# Patient Record
Sex: MALE | Race: WHITE | NOT HISPANIC OR LATINO
[De-identification: names, ages, dates, MRNs, and addresses within clinical notes are randomized per-mention and may not be internally consistent; named-entity substitution may affect disease eponyms.]

---

## 2017-01-19 ENCOUNTER — MEDICATION RENEWAL (OUTPATIENT)
Age: 62
End: 2017-01-19

## 2017-03-07 ENCOUNTER — MEDICATION RENEWAL (OUTPATIENT)
Age: 62
End: 2017-03-07

## 2017-04-17 ENCOUNTER — MEDICATION RENEWAL (OUTPATIENT)
Age: 62
End: 2017-04-17

## 2017-06-19 ENCOUNTER — MEDICATION RENEWAL (OUTPATIENT)
Age: 62
End: 2017-06-19

## 2017-09-07 ENCOUNTER — RX RENEWAL (OUTPATIENT)
Age: 62
End: 2017-09-07

## 2017-11-30 ENCOUNTER — LABORATORY RESULT (OUTPATIENT)
Age: 62
End: 2017-11-30

## 2017-11-30 ENCOUNTER — MEDICATION RENEWAL (OUTPATIENT)
Age: 62
End: 2017-11-30

## 2017-11-30 ENCOUNTER — APPOINTMENT (OUTPATIENT)
Dept: INTERNAL MEDICINE | Facility: CLINIC | Age: 62
End: 2017-11-30
Payer: COMMERCIAL

## 2017-11-30 LAB — SAVE SPECIMEN: NORMAL

## 2017-11-30 PROCEDURE — 36415 COLL VENOUS BLD VENIPUNCTURE: CPT

## 2017-12-05 ENCOUNTER — NON-APPOINTMENT (OUTPATIENT)
Age: 62
End: 2017-12-05

## 2017-12-05 ENCOUNTER — APPOINTMENT (OUTPATIENT)
Dept: INTERNAL MEDICINE | Facility: CLINIC | Age: 62
End: 2017-12-05
Payer: COMMERCIAL

## 2017-12-05 VITALS
RESPIRATION RATE: 18 BRPM | DIASTOLIC BLOOD PRESSURE: 76 MMHG | OXYGEN SATURATION: 97 % | WEIGHT: 198 LBS | HEIGHT: 70 IN | HEART RATE: 74 BPM | TEMPERATURE: 97.8 F | SYSTOLIC BLOOD PRESSURE: 118 MMHG | BODY MASS INDEX: 28.35 KG/M2

## 2017-12-05 DIAGNOSIS — N13.8 BENIGN PROSTATIC HYPERPLASIA WITH LOWER URINARY TRACT SYMPMS: ICD-10-CM

## 2017-12-05 DIAGNOSIS — M54.9 DORSALGIA, UNSPECIFIED: ICD-10-CM

## 2017-12-05 DIAGNOSIS — N40.1 BENIGN PROSTATIC HYPERPLASIA WITH LOWER URINARY TRACT SYMPMS: ICD-10-CM

## 2017-12-05 PROCEDURE — 93000 ELECTROCARDIOGRAM COMPLETE: CPT

## 2017-12-05 PROCEDURE — 99396 PREV VISIT EST AGE 40-64: CPT | Mod: 25

## 2017-12-05 PROCEDURE — 94010 BREATHING CAPACITY TEST: CPT

## 2017-12-07 DIAGNOSIS — G47.00 INSOMNIA, UNSPECIFIED: ICD-10-CM

## 2017-12-07 LAB
ALBUMIN SERPL ELPH-MCNC: 4.5 G/DL
ALP BLD-CCNC: 61 U/L
ALT SERPL-CCNC: 48 U/L
ANION GAP SERPL CALC-SCNC: 15 MMOL/L
APPEARANCE: CLEAR
AST SERPL-CCNC: 33 U/L
BASOPHILS # BLD AUTO: 0.01 K/UL
BASOPHILS NFR BLD AUTO: 0.2 %
BILIRUB SERPL-MCNC: 0.7 MG/DL
BILIRUBIN URINE: NEGATIVE
BLOOD URINE: NEGATIVE
BUN SERPL-MCNC: 18 MG/DL
CALCIUM SERPL-MCNC: 9.5 MG/DL
CHLORIDE SERPL-SCNC: 102 MMOL/L
CHOLEST SERPL-MCNC: 187 MG/DL
CHOLEST/HDLC SERPL: 4 RATIO
CO2 SERPL-SCNC: 25 MMOL/L
COLOR: YELLOW
CREAT SERPL-MCNC: 0.97 MG/DL
EOSINOPHIL # BLD AUTO: 0.05 K/UL
EOSINOPHIL NFR BLD AUTO: 1.2 %
ERYTHROCYTE [SEDIMENTATION RATE] IN BLOOD BY WESTERGREN METHOD: 5 MM/HR
GLUCOSE QUALITATIVE U: NEGATIVE MG/DL
GLUCOSE SERPL-MCNC: 102 MG/DL
HCT VFR BLD CALC: 46 %
HDLC SERPL-MCNC: 47 MG/DL
HGB BLD-MCNC: 15.2 G/DL
IMM GRANULOCYTES NFR BLD AUTO: 0.2 %
KETONES URINE: NEGATIVE
LDLC SERPL CALC-MCNC: 127 MG/DL
LDLC SERPL DIRECT ASSAY-MCNC: 132 MG/DL
LEUKOCYTE ESTERASE URINE: NEGATIVE
LYMPHOCYTES # BLD AUTO: 1.42 K/UL
LYMPHOCYTES NFR BLD AUTO: 35 %
MAN DIFF?: NORMAL
MCHC RBC-ENTMCNC: 31 PG
MCHC RBC-ENTMCNC: 33 GM/DL
MCV RBC AUTO: 93.9 FL
MONOCYTES # BLD AUTO: 0.46 K/UL
MONOCYTES NFR BLD AUTO: 11.3 %
NEUTROPHILS # BLD AUTO: 2.11 K/UL
NEUTROPHILS NFR BLD AUTO: 52.1 %
NITRITE URINE: NEGATIVE
PH URINE: 7
PLATELET # BLD AUTO: 173 K/UL
POTASSIUM SERPL-SCNC: 4.3 MMOL/L
PROT SERPL-MCNC: 7.3 G/DL
PROTEIN URINE: NEGATIVE MG/DL
PSA SERPL-MCNC: 1.39 NG/ML
RBC # BLD: 4.9 M/UL
RBC # FLD: 13.9 %
SODIUM SERPL-SCNC: 142 MMOL/L
SPECIFIC GRAVITY URINE: 1.01
T3 SERPL-MCNC: 123 NG/DL
T3RU NFR SERPL: 1.12 INDEX
T4 FREE SERPL-MCNC: 1.1 NG/DL
TRIGL SERPL-MCNC: 64 MG/DL
TSH SERPL-ACNC: 0.85 UIU/ML
UROBILINOGEN URINE: NEGATIVE MG/DL
WBC # FLD AUTO: 4.06 K/UL

## 2017-12-07 RX ORDER — ZOLPIDEM TARTRATE 5 MG/1
5 TABLET ORAL
Refills: 0 | Status: DISCONTINUED | COMMUNITY
End: 2017-12-07

## 2018-02-15 ENCOUNTER — MEDICATION RENEWAL (OUTPATIENT)
Age: 63
End: 2018-02-15

## 2018-03-27 ENCOUNTER — MEDICATION RENEWAL (OUTPATIENT)
Age: 63
End: 2018-03-27

## 2018-05-07 ENCOUNTER — RX RENEWAL (OUTPATIENT)
Age: 63
End: 2018-05-07

## 2018-06-15 ENCOUNTER — MEDICATION RENEWAL (OUTPATIENT)
Age: 63
End: 2018-06-15

## 2018-06-30 ENCOUNTER — INPATIENT (INPATIENT)
Facility: HOSPITAL | Age: 63
LOS: 0 days | Discharge: HOME | End: 2018-07-01
Attending: HOSPITALIST | Admitting: HOSPITALIST
Payer: COMMERCIAL

## 2018-06-30 VITALS
SYSTOLIC BLOOD PRESSURE: 118 MMHG | OXYGEN SATURATION: 100 % | DIASTOLIC BLOOD PRESSURE: 64 MMHG | TEMPERATURE: 98 F | HEART RATE: 66 BPM | RESPIRATION RATE: 17 BRPM

## 2018-06-30 DIAGNOSIS — S22.41XA MULTIPLE FRACTURES OF RIBS, RIGHT SIDE, INITIAL ENCOUNTER FOR CLOSED FRACTURE: ICD-10-CM

## 2018-06-30 DIAGNOSIS — R52 PAIN, UNSPECIFIED: ICD-10-CM

## 2018-06-30 DIAGNOSIS — Z29.9 ENCOUNTER FOR PROPHYLACTIC MEASURES, UNSPECIFIED: ICD-10-CM

## 2018-06-30 DIAGNOSIS — F41.9 ANXIETY DISORDER, UNSPECIFIED: ICD-10-CM

## 2018-06-30 LAB
ALBUMIN SERPL ELPH-MCNC: 4.2 G/DL — SIGNIFICANT CHANGE UP (ref 3.3–5)
ALP SERPL-CCNC: 67 U/L — SIGNIFICANT CHANGE UP (ref 40–120)
ALT FLD-CCNC: 75 U/L — HIGH (ref 4–41)
AST SERPL-CCNC: 63 U/L — HIGH (ref 4–40)
BASOPHILS # BLD AUTO: 0.02 K/UL — SIGNIFICANT CHANGE UP (ref 0–0.2)
BASOPHILS NFR BLD AUTO: 0.2 % — SIGNIFICANT CHANGE UP (ref 0–2)
BILIRUB SERPL-MCNC: 0.7 MG/DL — SIGNIFICANT CHANGE UP (ref 0.2–1.2)
BUN SERPL-MCNC: 17 MG/DL — SIGNIFICANT CHANGE UP (ref 7–23)
CALCIUM SERPL-MCNC: 9.1 MG/DL — SIGNIFICANT CHANGE UP (ref 8.4–10.5)
CHLORIDE SERPL-SCNC: 102 MMOL/L — SIGNIFICANT CHANGE UP (ref 98–107)
CO2 SERPL-SCNC: 25 MMOL/L — SIGNIFICANT CHANGE UP (ref 22–31)
CREAT SERPL-MCNC: 0.94 MG/DL — SIGNIFICANT CHANGE UP (ref 0.5–1.3)
EOSINOPHIL # BLD AUTO: 0 K/UL — SIGNIFICANT CHANGE UP (ref 0–0.5)
EOSINOPHIL NFR BLD AUTO: 0 % — SIGNIFICANT CHANGE UP (ref 0–6)
GLUCOSE SERPL-MCNC: 117 MG/DL — HIGH (ref 70–99)
HCT VFR BLD CALC: 42.4 % — SIGNIFICANT CHANGE UP (ref 39–50)
HGB BLD-MCNC: 14.8 G/DL — SIGNIFICANT CHANGE UP (ref 13–17)
IMM GRANULOCYTES # BLD AUTO: 0.05 # — SIGNIFICANT CHANGE UP
IMM GRANULOCYTES NFR BLD AUTO: 0.5 % — SIGNIFICANT CHANGE UP (ref 0–1.5)
LYMPHOCYTES # BLD AUTO: 0.68 K/UL — LOW (ref 1–3.3)
LYMPHOCYTES # BLD AUTO: 6.4 % — LOW (ref 13–44)
MCHC RBC-ENTMCNC: 30.5 PG — SIGNIFICANT CHANGE UP (ref 27–34)
MCHC RBC-ENTMCNC: 34.9 % — SIGNIFICANT CHANGE UP (ref 32–36)
MCV RBC AUTO: 87.4 FL — SIGNIFICANT CHANGE UP (ref 80–100)
MONOCYTES # BLD AUTO: 0.73 K/UL — SIGNIFICANT CHANGE UP (ref 0–0.9)
MONOCYTES NFR BLD AUTO: 6.8 % — SIGNIFICANT CHANGE UP (ref 2–14)
NEUTROPHILS # BLD AUTO: 9.19 K/UL — HIGH (ref 1.8–7.4)
NEUTROPHILS NFR BLD AUTO: 86.1 % — HIGH (ref 43–77)
NRBC # FLD: 0 — SIGNIFICANT CHANGE UP
PLATELET # BLD AUTO: 175 K/UL — SIGNIFICANT CHANGE UP (ref 150–400)
PMV BLD: 9.5 FL — SIGNIFICANT CHANGE UP (ref 7–13)
POTASSIUM SERPL-MCNC: 4.2 MMOL/L — SIGNIFICANT CHANGE UP (ref 3.5–5.3)
POTASSIUM SERPL-SCNC: 4.2 MMOL/L — SIGNIFICANT CHANGE UP (ref 3.5–5.3)
PROT SERPL-MCNC: 7 G/DL — SIGNIFICANT CHANGE UP (ref 6–8.3)
RBC # BLD: 4.85 M/UL — SIGNIFICANT CHANGE UP (ref 4.2–5.8)
RBC # FLD: 12.7 % — SIGNIFICANT CHANGE UP (ref 10.3–14.5)
SODIUM SERPL-SCNC: 139 MMOL/L — SIGNIFICANT CHANGE UP (ref 135–145)
TROPONIN T, HIGH SENSITIVITY: 13 NG/L — SIGNIFICANT CHANGE UP (ref ?–14)
TROPONIN T, HIGH SENSITIVITY: 15 NG/L — SIGNIFICANT CHANGE UP (ref ?–14)
WBC # BLD: 10.67 K/UL — HIGH (ref 3.8–10.5)
WBC # FLD AUTO: 10.67 K/UL — HIGH (ref 3.8–10.5)

## 2018-06-30 PROCEDURE — 99223 1ST HOSP IP/OBS HIGH 75: CPT | Mod: AI

## 2018-06-30 PROCEDURE — 72125 CT NECK SPINE W/O DYE: CPT | Mod: 26

## 2018-06-30 PROCEDURE — 71250 CT THORAX DX C-: CPT | Mod: 26

## 2018-06-30 PROCEDURE — 70450 CT HEAD/BRAIN W/O DYE: CPT | Mod: 26

## 2018-06-30 PROCEDURE — 73630 X-RAY EXAM OF FOOT: CPT | Mod: 26,LT

## 2018-06-30 PROCEDURE — 73502 X-RAY EXAM HIP UNI 2-3 VIEWS: CPT | Mod: 26,RT

## 2018-06-30 RX ORDER — KETOROLAC TROMETHAMINE 30 MG/ML
15 SYRINGE (ML) INJECTION ONCE
Qty: 0 | Refills: 0 | Status: DISCONTINUED | OUTPATIENT
Start: 2018-06-30 | End: 2018-06-30

## 2018-06-30 RX ORDER — MORPHINE SULFATE 50 MG/1
4 CAPSULE, EXTENDED RELEASE ORAL ONCE
Qty: 0 | Refills: 0 | Status: DISCONTINUED | OUTPATIENT
Start: 2018-06-30 | End: 2018-06-30

## 2018-06-30 RX ORDER — OXYCODONE AND ACETAMINOPHEN 5; 325 MG/1; MG/1
2 TABLET ORAL EVERY 4 HOURS
Qty: 0 | Refills: 0 | Status: DISCONTINUED | OUTPATIENT
Start: 2018-06-30 | End: 2018-07-01

## 2018-06-30 RX ORDER — ESCITALOPRAM OXALATE 10 MG/1
10 TABLET, FILM COATED ORAL DAILY
Qty: 0 | Refills: 0 | Status: DISCONTINUED | OUTPATIENT
Start: 2018-06-30 | End: 2018-07-01

## 2018-06-30 RX ORDER — ACETAMINOPHEN 500 MG
650 TABLET ORAL EVERY 6 HOURS
Qty: 0 | Refills: 0 | Status: DISCONTINUED | OUTPATIENT
Start: 2018-06-30 | End: 2018-07-01

## 2018-06-30 RX ORDER — DESLORATADINE AND PSEUDOEPHEDRINE SULFATE 2.5; 12 MG/1; MG/1
0 TABLET, EXTENDED RELEASE ORAL
Qty: 0 | Refills: 0 | COMMUNITY

## 2018-06-30 RX ORDER — IBUPROFEN 200 MG
600 TABLET ORAL EVERY 6 HOURS
Qty: 0 | Refills: 0 | Status: DISCONTINUED | OUTPATIENT
Start: 2018-06-30 | End: 2018-07-01

## 2018-06-30 RX ORDER — POLYETHYLENE GLYCOL 3350 17 G/17G
17 POWDER, FOR SOLUTION ORAL DAILY
Qty: 0 | Refills: 0 | Status: DISCONTINUED | OUTPATIENT
Start: 2018-06-30 | End: 2018-07-01

## 2018-06-30 RX ORDER — TETANUS TOXOID, REDUCED DIPHTHERIA TOXOID AND ACELLULAR PERTUSSIS VACCINE, ADSORBED 5; 2.5; 8; 8; 2.5 [IU]/.5ML; [IU]/.5ML; UG/.5ML; UG/.5ML; UG/.5ML
0.5 SUSPENSION INTRAMUSCULAR ONCE
Qty: 0 | Refills: 0 | Status: COMPLETED | OUTPATIENT
Start: 2018-06-30 | End: 2018-06-30

## 2018-06-30 RX ORDER — ESCITALOPRAM OXALATE 10 MG/1
1 TABLET, FILM COATED ORAL
Qty: 0 | Refills: 0 | COMMUNITY

## 2018-06-30 RX ORDER — ACETAMINOPHEN 500 MG
1000 TABLET ORAL ONCE
Qty: 0 | Refills: 0 | Status: COMPLETED | OUTPATIENT
Start: 2018-06-30 | End: 2018-06-30

## 2018-06-30 RX ORDER — ONDANSETRON 8 MG/1
4 TABLET, FILM COATED ORAL ONCE
Qty: 0 | Refills: 0 | Status: COMPLETED | OUTPATIENT
Start: 2018-06-30 | End: 2018-06-30

## 2018-06-30 RX ORDER — ONDANSETRON 8 MG/1
4 TABLET, FILM COATED ORAL EVERY 6 HOURS
Qty: 0 | Refills: 0 | Status: DISCONTINUED | OUTPATIENT
Start: 2018-06-30 | End: 2018-07-01

## 2018-06-30 RX ORDER — DOCUSATE SODIUM 100 MG
100 CAPSULE ORAL THREE TIMES A DAY
Qty: 0 | Refills: 0 | Status: DISCONTINUED | OUTPATIENT
Start: 2018-06-30 | End: 2018-07-01

## 2018-06-30 RX ORDER — MORPHINE SULFATE 50 MG/1
4 CAPSULE, EXTENDED RELEASE ORAL EVERY 4 HOURS
Qty: 0 | Refills: 0 | Status: DISCONTINUED | OUTPATIENT
Start: 2018-06-30 | End: 2018-07-01

## 2018-06-30 RX ORDER — HEPARIN SODIUM 5000 [USP'U]/ML
5000 INJECTION INTRAVENOUS; SUBCUTANEOUS EVERY 8 HOURS
Qty: 0 | Refills: 0 | Status: DISCONTINUED | OUTPATIENT
Start: 2018-06-30 | End: 2018-07-01

## 2018-06-30 RX ORDER — SENNA PLUS 8.6 MG/1
2 TABLET ORAL AT BEDTIME
Qty: 0 | Refills: 0 | Status: DISCONTINUED | OUTPATIENT
Start: 2018-06-30 | End: 2018-07-01

## 2018-06-30 RX ADMIN — TETANUS TOXOID, REDUCED DIPHTHERIA TOXOID AND ACELLULAR PERTUSSIS VACCINE, ADSORBED 0.5 MILLILITER(S): 5; 2.5; 8; 8; 2.5 SUSPENSION INTRAMUSCULAR at 06:36

## 2018-06-30 RX ADMIN — Medication 15 MILLIGRAM(S): at 20:20

## 2018-06-30 RX ADMIN — ONDANSETRON 4 MILLIGRAM(S): 8 TABLET, FILM COATED ORAL at 09:43

## 2018-06-30 RX ADMIN — MORPHINE SULFATE 4 MILLIGRAM(S): 50 CAPSULE, EXTENDED RELEASE ORAL at 17:11

## 2018-06-30 RX ADMIN — Medication 15 MILLIGRAM(S): at 08:32

## 2018-06-30 RX ADMIN — ONDANSETRON 4 MILLIGRAM(S): 8 TABLET, FILM COATED ORAL at 08:32

## 2018-06-30 RX ADMIN — Medication 15 MILLIGRAM(S): at 13:09

## 2018-06-30 RX ADMIN — Medication 100 MILLIGRAM(S): at 22:10

## 2018-06-30 RX ADMIN — MORPHINE SULFATE 4 MILLIGRAM(S): 50 CAPSULE, EXTENDED RELEASE ORAL at 06:52

## 2018-06-30 RX ADMIN — HEPARIN SODIUM 5000 UNIT(S): 5000 INJECTION INTRAVENOUS; SUBCUTANEOUS at 22:10

## 2018-06-30 RX ADMIN — Medication 1000 MILLIGRAM(S): at 08:18

## 2018-06-30 RX ADMIN — MORPHINE SULFATE 4 MILLIGRAM(S): 50 CAPSULE, EXTENDED RELEASE ORAL at 16:48

## 2018-06-30 RX ADMIN — MORPHINE SULFATE 4 MILLIGRAM(S): 50 CAPSULE, EXTENDED RELEASE ORAL at 13:09

## 2018-06-30 RX ADMIN — Medication 15 MILLIGRAM(S): at 20:35

## 2018-06-30 RX ADMIN — Medication 400 MILLIGRAM(S): at 07:45

## 2018-06-30 RX ADMIN — MORPHINE SULFATE 4 MILLIGRAM(S): 50 CAPSULE, EXTENDED RELEASE ORAL at 09:43

## 2018-06-30 RX ADMIN — MORPHINE SULFATE 4 MILLIGRAM(S): 50 CAPSULE, EXTENDED RELEASE ORAL at 06:36

## 2018-06-30 RX ADMIN — ONDANSETRON 4 MILLIGRAM(S): 8 TABLET, FILM COATED ORAL at 17:09

## 2018-06-30 NOTE — H&P ADULT - ATTENDING COMMENTS
plan to stop iv morphine in 24 hrs and transition to percocet x 5 days with ultimate transition to Motrin prn

## 2018-06-30 NOTE — ED ADULT NURSE NOTE - OBJECTIVE STATEMENT
pt arrives s/p fall down 15 steps. pt states he was going to bathroom and missed his step. pt arrives with c-collar in place complaining of neck pain, back pain and right sided rib pain. pt also c/o of bleeding from nose. pt appears comfortable and in NAD with no signs of respiratory distress. pt denies any abdominal pain, LOC, vomiting. waiting for CT scans will make primary RN chapin aware of status.

## 2018-06-30 NOTE — ED ADULT NURSE NOTE - CHIEF COMPLAINT QUOTE
Pt [facility]. c/o Neck, Right Side, Hip pain s/p Tripped and Fall, 15 steps of the stairs. Nose bleeding noted. Denies LOC Dizziness HA N V , denies any Blood Thinners ABBIE called for Trauma eval.

## 2018-06-30 NOTE — PATIENT PROFILE ADULT. - ACCEPTABLE
Monitoring BP.  Receiving Lopressor, Norvasc
control BP
3

## 2018-06-30 NOTE — ED PROVIDER NOTE - NEUROLOGICAL, MLM
Alert and oriented, no focal deficits, no motor or sensory deficits. Alert and oriented, no focal deficits, no motor or sensory deficits. S1 intact b/l, no saddle anesthesias,

## 2018-06-30 NOTE — ED ADULT NURSE REASSESSMENT NOTE - NS ED NURSE REASSESS COMMENT FT1
patient received from night CONNER [facility]. Patient resting in no distress. attempted to assist pt to a standing position but pt was unable to tolerate d/t pain pt became diaphoretic and nauseated, did vomit once. pt medicated with zofran and toradol. will reassess

## 2018-06-30 NOTE — ED PROVIDER NOTE - SHIFT CHANGE DETAILS
I have signed over this patient to the above attending physician. Pertinent history, physical exam findings and workup thus far in the ED have been discussed. The pending tests and plan, including pain control and reassessment were signed over.  All questions from the above attending physician have been answered.

## 2018-06-30 NOTE — ED ADULT TRIAGE NOTE - CHIEF COMPLAINT QUOTE
Pt [facility]. c/o Neck, Right Side, Hip pain s/p Tripped and Fall, 15 steps of the stairs. Nose bleeding noted. Denies LOC Dizziness HA N V , denies any Blood Thinners ABBIE called for Trauma eval.

## 2018-06-30 NOTE — H&P ADULT - HISTORY OF PRESENT ILLNESS
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  He noted No LOC.  No chest pain or SOB. He denied dizziness/ seizure or shaking.  He noted R back pain and loss of urination due to pain. 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  He noted No LOC.  No chest pain or SOB. He denied dizziness/ seizure or shaking.  He noted R back pain and loss of urination due to pain.

## 2018-06-30 NOTE — H&P ADULT - NSHPLABSRESULTS_GEN_ALL_CORE
LABS:                        14.8   10.67 )-----------( 175      ( 30 Jun 2018 05:48 )             42.4     06-30    139  |  102  |  17  ----------------------------<  117<H>  4.2   |  25  |  0.94    Ca    9.1      30 Jun 2018 05:48    TPro  7.0  /  Alb  4.2  /  TBili  0.7  /  DBili  x   /  AST  63<H>  /  ALT  75<H>  /  AlkPhos  67  06-30          Microbiology     EKG:    RADIOLOGY & ADDITIONAL TESTS: LABS:                        14.8   10.67 )-----------( 175      ( 30 Jun 2018 05:48 )             42.4     06-30    139  |  102  |  17  ----------------------------<  117<H>  4.2   |  25  |  0.94    Ca    9.1      30 Jun 2018 05:48    TPro  7.0  /  Alb  4.2  /  TBili  0.7  /  DBili  x   /  AST  63<H>  /  ALT  75<H>  /  AlkPhos  67  06-30          Microbiology     EKG:    RADIOLOGY & ADDITIONAL TESTS:  < from: CT Chest No Cont (06.30.18 @ 07:01) >      IMPRESSION:    Minimally displaced right posterior 9th and 10th rib fractures. No   pneumothorax. Trace right pleural effusion.    Probable bone island in the right posterior 7th rib. If there are risk   factors for malignancy, a bone scan can be obtained to exclude presence   of metabolic activity.    < from: CT Cervical Spine No Cont (06.30.18 @ 07:01) >    IMPRESSION:    1. Head CT: No intracranial hemorrhage or displaced calvarial fracture.     2. Cervical spine CT: No acute displaced fracture or traumatic   malalignment.    < from: Xray Foot AP + Lateral + Oblique, Left (06.30.18 @ 06:50) >    IMPRESSION:     No acute fracture or dislocation of the left foot.    < from: Xray Hip 1 View, Right (06.30.18 @ 06:49) >    IMPRESSION:     No acute fracture or dislocation.    < from: Xray Pelvis AP only (06.30.18 @ 06:49) >      IMPRESSION:     No acute fracture or dislocation.      < end of copied text > LABS:                        14.8   10.67 )-----------( 175      ( 30 Jun 2018 05:48 )             42.4     06-30    139  |  102  |  17  ----------------------------<  117<H>  4.2   |  25  |  0.94    Ca    9.1      30 Jun 2018 05:48    TPro  7.0  /  Alb  4.2  /  TBili  0.7  /  DBili  x   /  AST  63<H>  /  ALT  75<H>  /  AlkPhos  67  06-30      Microbiology     EKG:    RADIOLOGY & ADDITIONAL TESTS:  < from: CT Chest No Cont (06.30.18 @ 07:01) >      IMPRESSION:    Minimally displaced right posterior 9th and 10th rib fractures. No   pneumothorax. Trace right pleural effusion.    Probable bone island in the right posterior 7th rib. If there are risk   factors for malignancy, a bone scan can be obtained to exclude presence   of metabolic activity.    < from: CT Cervical Spine No Cont (06.30.18 @ 07:01) >    IMPRESSION:    1. Head CT: No intracranial hemorrhage or displaced calvarial fracture.     2. Cervical spine CT: No acute displaced fracture or traumatic   malalignment.    < from: Xray Foot AP + Lateral + Oblique, Left (06.30.18 @ 06:50) >    IMPRESSION:     No acute fracture or dislocation of the left foot.    < from: Xray Hip 1 View, Right (06.30.18 @ 06:49) >    IMPRESSION:     No acute fracture or dislocation.    < from: Xray Pelvis AP only (06.30.18 @ 06:49) >      IMPRESSION:     No acute fracture or dislocation.      < end of copied text >

## 2018-06-30 NOTE — H&P ADULT - ASSESSMENT
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  He noted No LOC.  No chest pain or SOB. He denied dizziness/ seizure or shaking.  He noted R back pain and loss of urination due to pain.  Ct of head/ neck- negative for Fx  Cxr- R 9th and 10 th rib FX-Minimally displaced right posterior 9th and 10th rib fractures. No   pneumothorax. Trace right pleural effusion.  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  He noted No LOC.  HS troponin 15, 14- JULIAN- negative.  No chest pain or SOB. He denied dizziness/ seizure or shaking.  He noted R back pain and loss of urination due to pain.  Ct of head/ neck- negative for Fx  Cxr- R 9th and 10 th rib FX-Minimally displaced right posterior 9th and 10th rib fractures. No   pneumothorax. Trace right pleural effusion.  Patient with R 9th and 10th Rib fx sec to mechanical fall

## 2018-06-30 NOTE — ED PROVIDER NOTE - PHYSICAL EXAMINATION
CHEST: + right sided anterior and posterior ribs 7-9 TTP. CHEST: + right sided anterior and posterior ribs 8-10 TTP.

## 2018-06-30 NOTE — ED PROVIDER NOTE - MEDICAL DECISION MAKING DETAILS
61 yo male s/p fall with likley rib fractures. will obtain ct head, neck and chest, XR pelvis, pain control and reassess.

## 2018-06-30 NOTE — H&P ADULT - NSHPSOCIALHISTORY_GEN_ALL_CORE
and lives with wife and daughter.  No tobacco/ alc/ ivda  and lives with wife.  No tobacco/ IVDA.  Notes he drinks wine 3x/week on /off with dinner.

## 2018-06-30 NOTE — ED PROVIDER NOTE - PROGRESS NOTE DETAILS
ABBIE trauma eval: Called by ambulance triage for trauma eval. 61 yo M with fall down flight of stairs approximately 6 hours ago. Patient states he got up to go to bathroom and is unsure what happened but found himself at bottom of stairs. Endorses nasal pain and epistaxis now resolved. Also right thoracic back pain. States wife is nurse, was monitoring him after he fell, but states he got up off couch prior to arrival and felt like he was going to pass out. Also urinated on himself. Endorses lumbar herniated discs, no change in LBP since fall. Neuro exam 5/5 strength in BUE and BLE, TTP on right lateral ribs. Patient in c-collar, requested transfer from ambulance bay to trauma room for further physical exam. Patient endorsed to Dr. Olivia and Dr. Ovalle. Dontae HEATON- pt has persistent pain and became nauseous, needs help to get up and looks pale. Pt appears dehydrated and will admit for 2 rib fxs and pain control

## 2018-06-30 NOTE — ED ADULT NURSE REASSESSMENT NOTE - NS ED NURSE REASSESS COMMENT FT1
instructed pt on incentive spirometry, pt demonstrated 2500 cc expiration without difficulty. repeated via return demonstration.

## 2018-06-30 NOTE — ED PROVIDER NOTE - CARE PLAN
Principal Discharge DX:	Closed fracture of multiple ribs of right side, initial encounter  Secondary Diagnosis:	Closed head injury, initial encounter Principal Discharge DX:	Closed fracture of multiple ribs of right side, initial encounter  Secondary Diagnosis:	Closed head injury, initial encounter  Secondary Diagnosis:	Intractable pain

## 2018-06-30 NOTE — H&P ADULT - NSHPREVIEWOFSYSTEMS_GEN_ALL_CORE
CONSTITUTIONAL: No fever, weight loss, or fatigue  EYES: No eye pain, visual disturbances, or discharge  ENMT:  No difficulty hearing, tinnitus, vertigo; No sinus or throat pain  NECK: No pain or stiffness  BREASTS: No pain, masses, or nipple discharge  RESPIRATORY: No cough, wheezing, chills or hemoptysis; No shortness of breath  CARDIOVASCULAR: No chest pain, palpitations, dizziness, or leg swelling  GASTROINTESTINAL: No abdominal or epigastric pain. No nausea, vomiting, or hematemesis; No diarrhea or constipation. No melena or hematochezia.  GENITOURINARY: No dysuria, frequency, hematuria, or incontinence  NEUROLOGICAL: No headaches, memory loss, loss of strength, numbness, or tremors  SKIN: No itching, burning, rashes, or lesions   LYMPH NODES: No enlarged glands  ENDOCRINE: No heat or cold intolerance; No hair loss  MUSCULOSKELETAL: R upper back pain  PSYCHIATRIC: No depression, anxiety, mood swings, or difficulty sleeping  HEME/LYMPH: No easy bruising, or bleeding gums  ALLERGY AND IMMUNOLOGIC: No hives or eczema

## 2018-06-30 NOTE — ED PROVIDER NOTE - NS_EDPROVIDERDISPOUSERTYPE_ED_A_ED
Requesting W/C orders with elevating legs, and Discharge to home.    Attending Attestation (For Attendings USE Only)...

## 2018-06-30 NOTE — ED PROVIDER NOTE - MUSCULOSKELETAL MINIMAL EXAM
+right hip TTP. 5/5 strength, + left great toenail injury +right hip TTP. 5/5 strength, + left great toenail injury, NO midline spinal TTP.

## 2018-06-30 NOTE — H&P ADULT - NSHPPHYSICALEXAM_GEN_ALL_CORE
PHYSICAL EXAM:  Vital Signs Last 24 Hrs  T(C): 36.6 (30 Jun 2018 15:24), Max: 37 (30 Jun 2018 06:12)  T(F): 97.8 (30 Jun 2018 15:24), Max: 98.6 (30 Jun 2018 06:12)  HR: 69 (30 Jun 2018 15:24) (66 - 86)  BP: 111/79 (30 Jun 2018 15:24) (102/60 - 131/71)  BP(mean): --  RR: 18 (30 Jun 2018 15:24) (16 - 18)  SpO2: 100% (30 Jun 2018 15:24) (99% - 100%)  GENERAL: NAD, well-developed  HEAD:  Atraumatic, Normocephalic  EYES: EOMI, PERRLA, conjunctiva and sclera clear  NECK: Supple, no JVD  CHEST/LUNG: Clear to auscultation bilaterally; no wheeze  Back- R upper back with area of erythema   HEART: Regular rate and rhythm; no murmurs, rubs, or gallops  ABDOMEN: Soft, Nontender, Nondistended; Bowel sounds present  EXTREMITIES:  warm and well perfused, no clubbing, cyanosis, or edema  PSYCH: AAOx3  NEUROLOGY: non-focal  SKIN: R upper back 2x2 cm area of erythema  L great toe nail- laceration

## 2018-06-30 NOTE — ED PROVIDER NOTE - OBJECTIVE STATEMENT
61 yo male s/p mechanical fall down approximately 15 stairs at home when he got up to go to the bathroom. + head and neck trauma. Pt c/o right sided hip and rib pain. + right sided neck pain. unknown LOC. NO headache or dizziness. NO chest or abdominal pain. No SOB. No paresthesias or weakness. + right sided nasal pain. No anticoagulation use. unknown last Tdap.

## 2018-07-01 ENCOUNTER — TRANSCRIPTION ENCOUNTER (OUTPATIENT)
Age: 63
End: 2018-07-01

## 2018-07-01 VITALS
DIASTOLIC BLOOD PRESSURE: 75 MMHG | OXYGEN SATURATION: 100 % | TEMPERATURE: 98 F | HEART RATE: 67 BPM | SYSTOLIC BLOOD PRESSURE: 120 MMHG | RESPIRATION RATE: 17 BRPM

## 2018-07-01 LAB
BUN SERPL-MCNC: 15 MG/DL — SIGNIFICANT CHANGE UP (ref 7–23)
CALCIUM SERPL-MCNC: 8.7 MG/DL — SIGNIFICANT CHANGE UP (ref 8.4–10.5)
CHLORIDE SERPL-SCNC: 102 MMOL/L — SIGNIFICANT CHANGE UP (ref 98–107)
CO2 SERPL-SCNC: 25 MMOL/L — SIGNIFICANT CHANGE UP (ref 22–31)
CREAT SERPL-MCNC: 0.97 MG/DL — SIGNIFICANT CHANGE UP (ref 0.5–1.3)
GLUCOSE SERPL-MCNC: 112 MG/DL — HIGH (ref 70–99)
HCT VFR BLD CALC: 43.8 % — SIGNIFICANT CHANGE UP (ref 39–50)
HGB BLD-MCNC: 14.5 G/DL — SIGNIFICANT CHANGE UP (ref 13–17)
MCHC RBC-ENTMCNC: 30.7 PG — SIGNIFICANT CHANGE UP (ref 27–34)
MCHC RBC-ENTMCNC: 33.1 % — SIGNIFICANT CHANGE UP (ref 32–36)
MCV RBC AUTO: 92.8 FL — SIGNIFICANT CHANGE UP (ref 80–100)
NRBC # FLD: 0 — SIGNIFICANT CHANGE UP
PLATELET # BLD AUTO: 159 K/UL — SIGNIFICANT CHANGE UP (ref 150–400)
PMV BLD: 9.6 FL — SIGNIFICANT CHANGE UP (ref 7–13)
POTASSIUM SERPL-MCNC: 4 MMOL/L — SIGNIFICANT CHANGE UP (ref 3.5–5.3)
POTASSIUM SERPL-SCNC: 4 MMOL/L — SIGNIFICANT CHANGE UP (ref 3.5–5.3)
RBC # BLD: 4.72 M/UL — SIGNIFICANT CHANGE UP (ref 4.2–5.8)
RBC # FLD: 13 % — SIGNIFICANT CHANGE UP (ref 10.3–14.5)
SODIUM SERPL-SCNC: 139 MMOL/L — SIGNIFICANT CHANGE UP (ref 135–145)
WBC # BLD: 5.82 K/UL — SIGNIFICANT CHANGE UP (ref 3.8–10.5)
WBC # FLD AUTO: 5.82 K/UL — SIGNIFICANT CHANGE UP (ref 3.8–10.5)

## 2018-07-01 PROCEDURE — 99239 HOSP IP/OBS DSCHRG MGMT >30: CPT

## 2018-07-01 RX ADMIN — MORPHINE SULFATE 4 MILLIGRAM(S): 50 CAPSULE, EXTENDED RELEASE ORAL at 11:47

## 2018-07-01 RX ADMIN — HEPARIN SODIUM 5000 UNIT(S): 5000 INJECTION INTRAVENOUS; SUBCUTANEOUS at 06:49

## 2018-07-01 RX ADMIN — Medication 100 MILLIGRAM(S): at 06:49

## 2018-07-01 RX ADMIN — MORPHINE SULFATE 4 MILLIGRAM(S): 50 CAPSULE, EXTENDED RELEASE ORAL at 11:01

## 2018-07-01 NOTE — DISCHARGE NOTE ADULT - CONDITIONS AT DISCHARGE
PT is alert and oriented x4, pain meds IVP given prior to discharge, he is in good spirits, he ambulate independently,

## 2018-07-01 NOTE — DISCHARGE NOTE ADULT - PATIENT PORTAL LINK FT
You can access the [facility] Patient Portal, offered by [facility], by registering with the following website: [email or web address]

## 2018-07-01 NOTE — DISCHARGE NOTE ADULT - CARE PROVIDER_API CALL
Forrest Goncalves), Internal Medicine; Pulmonary Disease  [address] [place] [id number]  Phone: [phone]  Fax: [phone]

## 2018-07-01 NOTE — DISCHARGE NOTE ADULT - CARE PLAN
Principal Discharge DX:	Closed fracture of multiple ribs of right side, initial encounter  Goal:	Decreased pain level  Assessment and plan of treatment:	You have a 9th and 10th rib fracture to the right side.  Take pain medication as prescribed.  Follow up with your PCP within 1 week of discharge from hospital.  Secondary Diagnosis:	Anxiety  Assessment and plan of treatment:	Continue Lexapro as prescribed. Principal Discharge DX:	Closed fracture of multiple ribs of right side, initial encounter  Goal:	Decreased pain level  Assessment and plan of treatment:	You have a 9th and 10th rib fracture to the right side.  Take pain medication as prescribed.  Follow up with your PCP within 1 week of discharge from hospital  -Use an over the counter lidocaine patch to assist with pain control  -Take oxycodone IR as needed for pain  Secondary Diagnosis:	Anxiety  Assessment and plan of treatment:	Continue Lexapro as prescribed.

## 2018-07-01 NOTE — DISCHARGE NOTE ADULT - HOSPITAL COURSE
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  He noted No LOC.  No chest pain or SOB. He denied dizziness/ seizure or shaking.  He noted R back pain and loss of urination due to pain.    Hospital Course:  ·  Problem: Anxiety.  Plan: c/w Lexapro 10 mg Qd.      Problem/Plan - 2:  ·  Problem: Closed fracture of multiple ribs of right side, initial encounter.  Plan: 9th and 10th rib  Morphine iv prn severe pain x 24 hrs  Percocet prn mod pain  Motrin prn mild pain.      Problem/Plan - 3:  ·  Problem: Intractable pain.  Plan: Morphine iv prn severe pain x 24 hrs  Percocet prn mod pain  Motrin prn mild pain.     Dispo- Stable for discharge home. 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  He noted No LOC.No chest pain or SOB. He denied dizziness/ seizure or shaking.He noted R back pain and loss of urination due to pain. Patient was found to have rib fractures of the 9th and 10th rib. His pain was controlled with IV morphine. He was then transitioned to oral naproxen with PRN oxycodone IR and a lidocaine patch. He was educated on the importance of using his incentive spirometer to prevent atelectasis and possible infection thereafter. He is stable for dc home.    Dispo- Stable for discharge home.

## 2018-07-01 NOTE — PROGRESS NOTE ADULT - SUBJECTIVE AND OBJECTIVE BOX
Patient is a 62y old  Male who presents with a chief complaint of Fall down steps in dark (01 Jul 2018 11:40)      SUBJECTIVE / OVERNIGHT EVENTS: No acute events. Patient still reports right-sided rib pain, but improving. Wants to go home. Received 1 dose of IV morphine. States naproxen has worked well for him in the past.    MEDICATIONS  (STANDING):  docusate sodium 100 milliGRAM(s) Oral three times a day  escitalopram 10 milliGRAM(s) Oral daily  heparin  Injectable 5000 Unit(s) SubCutaneous every 8 hours  polyethylene glycol 3350 17 Gram(s) Oral daily  senna 2 Tablet(s) Oral at bedtime    MEDICATIONS  (PRN):  acetaminophen   Tablet 650 milliGRAM(s) Oral every 6 hours PRN For Temp greater than 38 C (100.4 F)  ibuprofen  Tablet 600 milliGRAM(s) Oral every 6 hours PRN mild pain  morphine  - Injectable 4 milliGRAM(s) IV Push every 4 hours PRN Severe Pain (7 - 10)  ondansetron   Disintegrating Tablet 4 milliGRAM(s) Oral every 6 hours PRN Nausea and/or Vomiting  oxyCODONE    5 mG/acetaminophen 325 mG 2 Tablet(s) Oral every 4 hours PRN Moderate Pain (4 - 6)      Vital Signs Last 24 Hrs  T(C): 36.8 (07-01-18 @ 11:57)  T(F): 98.3 (07-01-18 @ 11:57), Max: 98.3 (06-30-18 @ 20:56)  HR: 67 (07-01-18 @ 11:57) (60 - 67)  BP: 120/75 (07-01-18 @ 11:57)  BP(mean): --  RR: 17 (07-01-18 @ 11:57) (17 - 18)  SpO2: 100% (07-01-18 @ 11:57) (99% - 100%)  Wt(kg): --    CAPILLARY BLOOD GLUCOSE        I&O's Summary      PHYSICAL EXAM:  GENERAL: NAD, well-developed  HEAD:  Atraumatic, Normocephalic  EYES: EOMI, PERRLA, conjunctiva and sclera clear  NECK: Supple, No JVD  CHEST/LUNG: Clear to auscultation bilaterally; No wheeze, right-sided pain to palpation  HEART: Regular rate and rhythm; No murmurs,   ABDOMEN: Soft, Nontender, Nondistended; Bowel sounds present  EXTREMITIES:  2+ Peripheral Pulses, No clubbing, cyanosis, or edema  PSYCH: AAOx3  NEUROLOGY: non-focal  SKIN: No rashes or lesions    LABS:                        14.5   5.82  )-----------( 159      ( 01 Jul 2018 06:50 )             43.8     07-01    139  |  102  |  15  ----------------------------<  112<H>  4.0   |  25  |  0.97    Ca    8.7      01 Jul 2018 06:50    TPro  7.0  /  Alb  4.2  /  TBili  0.7  /  DBili  x   /  AST  63<H>  /  ALT  75<H>  /  AlkPhos  67  06-30              RADIOLOGY & ADDITIONAL TESTS:    Imaging Personally Reviewed:    Consultant(s) Notes Reviewed:      Care Discussed with Consultants/Other Providers:    Assessment and Plan:

## 2018-07-01 NOTE — DISCHARGE NOTE ADULT - MEDICATION SUMMARY - MEDICATIONS TO TAKE
I will START or STAY ON the medications listed below when I get home from the hospital:    Lexapro 10 mg oral tablet  -- 1 tab(s) by mouth once a day  -- Indication: For Anxiety    Clarinex-D 24 Hour oral tablet, extended release  -- Indication: For Allergies I will START or STAY ON the medications listed below when I get home from the hospital:    naproxen 500 mg oral tablet  -- 1 tab(s) by mouth 2 times a day   -- Check with your doctor before becoming pregnant.  May cause drowsiness or dizziness.  Obtain medical advice before taking any non-prescription drugs as some may affect the action of this medication.  Take with food or milk.    -- Indication: For Pain    naproxen 500 mg oral tablet  -- 1 tab(s) by mouth 2 times a day   -- Check with your doctor before becoming pregnant.  May cause drowsiness or dizziness.  Obtain medical advice before taking any non-prescription drugs as some may affect the action of this medication.  Take with food or milk.    -- Indication: For Pain    Lexapro 10 mg oral tablet  -- 1 tab(s) by mouth once a day  -- Indication: For Anxiety    Clarinex-D 24 Hour oral tablet, extended release  -- Indication: For Allergies

## 2018-07-01 NOTE — PROGRESS NOTE ADULT - PROBLEM SELECTOR PLAN 1
-Continue with incentive spirometry  -Will dc home with oxycodone IR 5mg PRN, naproxen 500mg po BID x 7 days and over the counter lidocaine patch for pain control

## 2018-07-01 NOTE — DISCHARGE NOTE ADULT - PLAN OF CARE
Decreased pain level You have a 9th and 10th rib fracture to the right side.  Take pain medication as prescribed.  Follow up with your PCP within 1 week of discharge from hospital. Continue Lexapro as prescribed. You have a 9th and 10th rib fracture to the right side.  Take pain medication as prescribed.  Follow up with your PCP within 1 week of discharge from hospital  -Use an over the counter lidocaine patch to assist with pain control  -Take oxycodone IR as needed for pain

## 2018-07-03 ENCOUNTER — APPOINTMENT (OUTPATIENT)
Dept: INTERNAL MEDICINE | Facility: CLINIC | Age: 63
End: 2018-07-03
Payer: COMMERCIAL

## 2018-07-03 VITALS
DIASTOLIC BLOOD PRESSURE: 80 MMHG | BODY MASS INDEX: 27.92 KG/M2 | SYSTOLIC BLOOD PRESSURE: 122 MMHG | HEIGHT: 70 IN | WEIGHT: 195 LBS | TEMPERATURE: 97.7 F

## 2018-07-03 DIAGNOSIS — Z09 ENCOUNTER FOR FOLLOW-UP EXAMINATION AFTER COMPLETED TREATMENT FOR CONDITIONS OTHER THAN MALIGNANT NEOPLASM: ICD-10-CM

## 2018-07-03 PROCEDURE — 99496 TRANSJ CARE MGMT HIGH F2F 7D: CPT

## 2018-07-03 RX ORDER — NAPROXEN 500 MG/1
500 TABLET ORAL
Qty: 30 | Refills: 0 | Status: ACTIVE | COMMUNITY
Start: 2018-07-01

## 2018-07-05 ENCOUNTER — APPOINTMENT (OUTPATIENT)
Dept: INTERNAL MEDICINE | Facility: CLINIC | Age: 63
End: 2018-07-05

## 2018-08-17 PROBLEM — F41.9 ANXIETY DISORDER, UNSPECIFIED: Chronic | Status: ACTIVE | Noted: 2018-06-30

## 2018-08-31 NOTE — H&P ADULT - NSHPPOASURGSITEINCISION_GEN_ALL_CORE
Pt has been made aware of normal U/S results and advise to try warm compresses, warm soaks prn, and Tylenol or Aleve prn if not allergic if pain still persists. Pt voiced understanding. DS   no

## 2018-09-18 ENCOUNTER — APPOINTMENT (OUTPATIENT)
Dept: INTERNAL MEDICINE | Facility: CLINIC | Age: 63
End: 2018-09-18
Payer: COMMERCIAL

## 2018-09-18 VITALS
OXYGEN SATURATION: 97 % | HEIGHT: 70 IN | DIASTOLIC BLOOD PRESSURE: 80 MMHG | TEMPERATURE: 97.6 F | WEIGHT: 194 LBS | HEART RATE: 76 BPM | BODY MASS INDEX: 27.77 KG/M2 | SYSTOLIC BLOOD PRESSURE: 112 MMHG | RESPIRATION RATE: 18 BRPM

## 2018-09-18 DIAGNOSIS — S22.49XA MULTIPLE FRACTURES OF RIBS, UNSPECIFIED SIDE, INITIAL ENCOUNTER FOR CLOSED FRACTURE: ICD-10-CM

## 2018-09-18 PROCEDURE — 71046 X-RAY EXAM CHEST 2 VIEWS: CPT

## 2018-09-18 PROCEDURE — 99215 OFFICE O/P EST HI 40 MIN: CPT | Mod: 25

## 2018-09-18 PROCEDURE — 90686 IIV4 VACC NO PRSV 0.5 ML IM: CPT

## 2018-09-18 PROCEDURE — G0008: CPT

## 2018-10-03 ENCOUNTER — APPOINTMENT (OUTPATIENT)
Dept: NEUROLOGY | Facility: CLINIC | Age: 63
End: 2018-10-03
Payer: COMMERCIAL

## 2018-10-03 VITALS
WEIGHT: 189 LBS | BODY MASS INDEX: 27.06 KG/M2 | HEIGHT: 70 IN | HEART RATE: 70 BPM | DIASTOLIC BLOOD PRESSURE: 74 MMHG | SYSTOLIC BLOOD PRESSURE: 123 MMHG

## 2018-10-03 DIAGNOSIS — G40.109 LOCALIZATION-RELATED (FOCAL) (PARTIAL) SYMPTOMATIC EPILEPSY AND EPILEPTIC SYNDROMES WITH SIMPLE PARTIAL SEIZURES, NOT INTRACTABLE, W/OUT STATUS EPILEPTICUS: ICD-10-CM

## 2018-10-03 DIAGNOSIS — R56.9 UNSPECIFIED CONVULSIONS: ICD-10-CM

## 2018-10-03 PROCEDURE — 99205 OFFICE O/P NEW HI 60 MIN: CPT

## 2018-10-18 ENCOUNTER — APPOINTMENT (OUTPATIENT)
Dept: NEUROLOGY | Facility: CLINIC | Age: 63
End: 2018-10-18
Payer: COMMERCIAL

## 2018-10-18 VITALS
BODY MASS INDEX: 27.2 KG/M2 | HEIGHT: 70 IN | DIASTOLIC BLOOD PRESSURE: 74 MMHG | SYSTOLIC BLOOD PRESSURE: 117 MMHG | WEIGHT: 190 LBS | HEART RATE: 69 BPM

## 2018-10-18 DIAGNOSIS — Z81.1 FAMILY HISTORY OF ALCOHOL ABUSE AND DEPENDENCE: ICD-10-CM

## 2018-10-18 DIAGNOSIS — Z81.8 FAMILY HISTORY OF OTHER MENTAL AND BEHAVIORAL DISORDERS: ICD-10-CM

## 2018-10-18 PROCEDURE — 99214 OFFICE O/P EST MOD 30 MIN: CPT

## 2018-11-01 ENCOUNTER — FORM ENCOUNTER (OUTPATIENT)
Age: 63
End: 2018-11-01

## 2018-11-02 ENCOUNTER — APPOINTMENT (OUTPATIENT)
Dept: MRI IMAGING | Facility: CLINIC | Age: 63
End: 2018-11-02
Payer: COMMERCIAL

## 2018-11-02 ENCOUNTER — OUTPATIENT (OUTPATIENT)
Dept: OUTPATIENT SERVICES | Facility: HOSPITAL | Age: 63
LOS: 1 days | End: 2018-11-02
Payer: COMMERCIAL

## 2018-11-02 DIAGNOSIS — N39.0 URINARY TRACT INFECTION, SITE NOT SPECIFIED: ICD-10-CM

## 2018-11-02 PROCEDURE — 72148 MRI LUMBAR SPINE W/O DYE: CPT

## 2018-11-02 PROCEDURE — 72148 MRI LUMBAR SPINE W/O DYE: CPT | Mod: 26

## 2018-11-15 ENCOUNTER — APPOINTMENT (OUTPATIENT)
Dept: NEUROLOGY | Facility: CLINIC | Age: 63
End: 2018-11-15
Payer: COMMERCIAL

## 2018-11-15 VITALS
BODY MASS INDEX: 27.2 KG/M2 | HEART RATE: 63 BPM | WEIGHT: 190 LBS | DIASTOLIC BLOOD PRESSURE: 68 MMHG | HEIGHT: 70 IN | SYSTOLIC BLOOD PRESSURE: 110 MMHG

## 2018-11-15 PROCEDURE — 99214 OFFICE O/P EST MOD 30 MIN: CPT

## 2018-11-19 ENCOUNTER — MEDICATION RENEWAL (OUTPATIENT)
Age: 63
End: 2018-11-19

## 2018-11-23 RX ORDER — ZOLPIDEM TARTRATE 5 MG/1
5 TABLET ORAL
Qty: 30 | Refills: 2 | Status: ACTIVE | COMMUNITY
Start: 2017-12-07 | End: 1900-01-01

## 2018-11-29 ENCOUNTER — LABORATORY RESULT (OUTPATIENT)
Age: 63
End: 2018-11-29

## 2018-11-29 ENCOUNTER — APPOINTMENT (OUTPATIENT)
Dept: INTERNAL MEDICINE | Facility: CLINIC | Age: 63
End: 2018-11-29
Payer: COMMERCIAL

## 2018-11-29 PROCEDURE — 36415 COLL VENOUS BLD VENIPUNCTURE: CPT

## 2018-12-06 ENCOUNTER — APPOINTMENT (OUTPATIENT)
Dept: INTERNAL MEDICINE | Facility: CLINIC | Age: 63
End: 2018-12-06
Payer: COMMERCIAL

## 2018-12-06 VITALS
SYSTOLIC BLOOD PRESSURE: 124 MMHG | BODY MASS INDEX: 26.63 KG/M2 | TEMPERATURE: 96.9 F | DIASTOLIC BLOOD PRESSURE: 72 MMHG | HEIGHT: 70 IN | WEIGHT: 186 LBS

## 2018-12-06 DIAGNOSIS — R10.2 PELVIC AND PERINEAL PAIN: ICD-10-CM

## 2018-12-06 PROCEDURE — 99396 PREV VISIT EST AGE 40-64: CPT | Mod: 25

## 2018-12-06 PROCEDURE — 93000 ELECTROCARDIOGRAM COMPLETE: CPT

## 2018-12-17 ENCOUNTER — RX RENEWAL (OUTPATIENT)
Age: 63
End: 2018-12-17

## 2018-12-19 LAB
ALBUMIN SERPL ELPH-MCNC: 4.8 G/DL
ALP BLD-CCNC: 65 U/L
ALT SERPL-CCNC: 29 U/L
ANION GAP SERPL CALC-SCNC: 13 MMOL/L
APPEARANCE: CLEAR
AST SERPL-CCNC: 21 U/L
BASOPHILS # BLD AUTO: 0.01 K/UL
BASOPHILS NFR BLD AUTO: 0.2 %
BILIRUB SERPL-MCNC: 1.1 MG/DL
BILIRUBIN URINE: NEGATIVE
BLOOD URINE: NEGATIVE
BUN SERPL-MCNC: 17 MG/DL
CALCIUM SERPL-MCNC: 9.9 MG/DL
CHLORIDE SERPL-SCNC: 100 MMOL/L
CHOLEST SERPL-MCNC: 212 MG/DL
CHOLEST/HDLC SERPL: 3.4 RATIO
CO2 SERPL-SCNC: 26 MMOL/L
COLOR: YELLOW
CREAT SERPL-MCNC: 1.12 MG/DL
EOSINOPHIL # BLD AUTO: 0.05 K/UL
EOSINOPHIL NFR BLD AUTO: 1.1 %
GLUCOSE QUALITATIVE U: NEGATIVE MG/DL
GLUCOSE SERPL-MCNC: 94 MG/DL
HCT VFR BLD CALC: 46.7 %
HDLC SERPL-MCNC: 63 MG/DL
HGB BLD-MCNC: 15.3 G/DL
IMM GRANULOCYTES NFR BLD AUTO: 0.2 %
KETONES URINE: NEGATIVE
LDLC SERPL CALC-MCNC: 136 MG/DL
LEUKOCYTE ESTERASE URINE: NEGATIVE
LYMPHOCYTES # BLD AUTO: 1.46 K/UL
LYMPHOCYTES NFR BLD AUTO: 33 %
MAN DIFF?: NORMAL
MCHC RBC-ENTMCNC: 31 PG
MCHC RBC-ENTMCNC: 32.8 GM/DL
MCV RBC AUTO: 94.7 FL
MONOCYTES # BLD AUTO: 0.45 K/UL
MONOCYTES NFR BLD AUTO: 10.2 %
NEUTROPHILS # BLD AUTO: 2.44 K/UL
NEUTROPHILS NFR BLD AUTO: 55.3 %
NITRITE URINE: NEGATIVE
PH URINE: 7.5
PLATELET # BLD AUTO: 205 K/UL
POTASSIUM SERPL-SCNC: 4.5 MMOL/L
PROT SERPL-MCNC: 7.4 G/DL
PROTEIN URINE: 30 MG/DL
PSA SERPL-MCNC: 1.67 NG/ML
RBC # BLD: 4.93 M/UL
RBC # FLD: 13.8 %
SODIUM SERPL-SCNC: 139 MMOL/L
SPECIFIC GRAVITY URINE: 1.02
T4 FREE SERPL-MCNC: 1.2 NG/DL
TRIGL SERPL-MCNC: 65 MG/DL
TSH SERPL-ACNC: 0.88 UIU/ML
UROBILINOGEN URINE: NEGATIVE MG/DL
WBC # FLD AUTO: 4.42 K/UL

## 2018-12-20 LAB — SAVE SPECIMEN: NORMAL

## 2019-01-03 ENCOUNTER — RX RENEWAL (OUTPATIENT)
Age: 64
End: 2019-01-03

## 2019-01-17 ENCOUNTER — APPOINTMENT (OUTPATIENT)
Dept: NEUROLOGY | Facility: CLINIC | Age: 64
End: 2019-01-17

## 2019-02-26 ENCOUNTER — MEDICATION RENEWAL (OUTPATIENT)
Age: 64
End: 2019-02-26
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
## 2019-05-24 ENCOUNTER — MEDICATION RENEWAL (OUTPATIENT)
Age: 64
End: 2019-05-24

## 2019-07-26 ENCOUNTER — MESSAGE (OUTPATIENT)
Age: 64
End: 2019-07-26

## 2019-07-26 DIAGNOSIS — C67.9 MALIGNANT NEOPLASM OF BLADDER, UNSPECIFIED: ICD-10-CM

## 2019-08-01 ENCOUNTER — APPOINTMENT (OUTPATIENT)
Dept: INTERNAL MEDICINE | Facility: CLINIC | Age: 64
End: 2019-08-01
Payer: COMMERCIAL

## 2019-08-01 VITALS
HEART RATE: 74 BPM | BODY MASS INDEX: 26.63 KG/M2 | WEIGHT: 186 LBS | SYSTOLIC BLOOD PRESSURE: 122 MMHG | OXYGEN SATURATION: 98 % | HEIGHT: 70 IN | DIASTOLIC BLOOD PRESSURE: 70 MMHG

## 2019-08-01 PROCEDURE — 99213 OFFICE O/P EST LOW 20 MIN: CPT

## 2019-08-01 RX ORDER — CYCLOBENZAPRINE HYDROCHLORIDE 5 MG/1
5 TABLET, FILM COATED ORAL
Qty: 90 | Refills: 2 | Status: DISCONTINUED | COMMUNITY
Start: 2017-03-06 | End: 2019-08-01

## 2019-08-01 RX ORDER — DULOXETINE HYDROCHLORIDE 30 MG/1
30 CAPSULE, DELAYED RELEASE PELLETS ORAL
Qty: 30 | Refills: 1 | Status: DISCONTINUED | COMMUNITY
Start: 2018-10-18 | End: 2019-08-01

## 2019-08-01 RX ORDER — DULOXETINE HYDROCHLORIDE 60 MG/1
60 CAPSULE, DELAYED RELEASE PELLETS ORAL
Qty: 30 | Refills: 0 | Status: DISCONTINUED | COMMUNITY
Start: 2018-12-04 | End: 2019-08-01

## 2020-01-07 ENCOUNTER — MEDICATION RENEWAL (OUTPATIENT)
Age: 65
End: 2020-01-07

## 2020-02-03 NOTE — DISCUSSION/SUMMARY
[Home] : patient was discharged to home [FreeTextEntry1] : Hospital Discharge Comment: Spoke with pt  f/u s/p hospitalization, pt declined f/u visit at this time stated he will call back to schedule f/u visit appoint if needed

## 2020-05-20 ENCOUNTER — RX RENEWAL (OUTPATIENT)
Age: 65
End: 2020-05-20

## 2020-10-07 ENCOUNTER — APPOINTMENT (OUTPATIENT)
Dept: INTERNAL MEDICINE | Facility: CLINIC | Age: 65
End: 2020-10-07
Payer: COMMERCIAL

## 2020-10-07 DIAGNOSIS — Z23 ENCOUNTER FOR IMMUNIZATION: ICD-10-CM

## 2020-10-07 PROCEDURE — G0008: CPT

## 2020-10-07 PROCEDURE — 90682 RIV4 VACC RECOMBINANT DNA IM: CPT

## 2020-10-20 ENCOUNTER — LABORATORY RESULT (OUTPATIENT)
Age: 65
End: 2020-10-20

## 2020-10-20 ENCOUNTER — APPOINTMENT (OUTPATIENT)
Dept: INTERNAL MEDICINE | Facility: CLINIC | Age: 65
End: 2020-10-20
Payer: COMMERCIAL

## 2020-10-20 LAB
ALBUMIN SERPL ELPH-MCNC: 4.6 G/DL
ALP BLD-CCNC: 73 U/L
ALT SERPL-CCNC: 24 U/L
ANION GAP SERPL CALC-SCNC: 12 MMOL/L
APPEARANCE: CLEAR
AST SERPL-CCNC: 20 U/L
BASOPHILS # BLD AUTO: 0.02 K/UL
BASOPHILS NFR BLD AUTO: 0.5 %
BILIRUB SERPL-MCNC: 0.8 MG/DL
BILIRUBIN URINE: NEGATIVE
BLOOD URINE: NEGATIVE
BUN SERPL-MCNC: 17 MG/DL
CALCIUM SERPL-MCNC: 9.4 MG/DL
CHLORIDE SERPL-SCNC: 103 MMOL/L
CHOLEST SERPL-MCNC: 196 MG/DL
CO2 SERPL-SCNC: 27 MMOL/L
COLOR: NORMAL
CREAT SERPL-MCNC: 0.94 MG/DL
EOSINOPHIL # BLD AUTO: 0.05 K/UL
EOSINOPHIL NFR BLD AUTO: 1.2 %
ERYTHROCYTE [SEDIMENTATION RATE] IN BLOOD BY WESTERGREN METHOD: 6 MM/HR
GLUCOSE QUALITATIVE U: NEGATIVE
GLUCOSE SERPL-MCNC: 97 MG/DL
HCT VFR BLD CALC: 44.1 %
HDLC SERPL-MCNC: 65 MG/DL
HGB BLD-MCNC: 14.7 G/DL
IMM GRANULOCYTES NFR BLD AUTO: 0.5 %
KETONES URINE: NEGATIVE
LDLC SERPL CALC-MCNC: 122 MG/DL
LDLC SERPL DIRECT ASSAY-MCNC: 126 MG/DL
LEUKOCYTE ESTERASE URINE: NEGATIVE
LYMPHOCYTES # BLD AUTO: 1.58 K/UL
LYMPHOCYTES NFR BLD AUTO: 38.8 %
MAN DIFF?: NORMAL
MCHC RBC-ENTMCNC: 31.3 PG
MCHC RBC-ENTMCNC: 33.3 GM/DL
MCV RBC AUTO: 94 FL
MONOCYTES # BLD AUTO: 0.4 K/UL
MONOCYTES NFR BLD AUTO: 9.8 %
NEUTROPHILS # BLD AUTO: 2 K/UL
NEUTROPHILS NFR BLD AUTO: 49.2 %
NITRITE URINE: NEGATIVE
NONHDLC SERPL-MCNC: 131 MG/DL
PH URINE: 7
PLATELET # BLD AUTO: 211 K/UL
POTASSIUM SERPL-SCNC: 4.6 MMOL/L
PROT SERPL-MCNC: 6.9 G/DL
PROTEIN URINE: NEGATIVE
PSA SERPL-MCNC: 2.05 NG/ML
RBC # BLD: 4.69 M/UL
RBC # FLD: 13 %
SAVE SPECIMEN: NORMAL
SODIUM SERPL-SCNC: 142 MMOL/L
SPECIFIC GRAVITY URINE: 1.01
T3 SERPL-MCNC: 97 NG/DL
T3RU NFR SERPL: 1.1 TBI
T4 FREE SERPL-MCNC: 1.2 NG/DL
TRIGL SERPL-MCNC: 45 MG/DL
TSH SERPL-ACNC: 1.32 UIU/ML
UROBILINOGEN URINE: NORMAL
WBC # FLD AUTO: 4.07 K/UL

## 2020-10-20 PROCEDURE — 36415 COLL VENOUS BLD VENIPUNCTURE: CPT

## 2020-11-12 ENCOUNTER — NON-APPOINTMENT (OUTPATIENT)
Age: 65
End: 2020-11-12

## 2020-11-12 ENCOUNTER — APPOINTMENT (OUTPATIENT)
Dept: INTERNAL MEDICINE | Facility: CLINIC | Age: 65
End: 2020-11-12
Payer: COMMERCIAL

## 2020-11-12 VITALS
WEIGHT: 185 LBS | SYSTOLIC BLOOD PRESSURE: 124 MMHG | HEART RATE: 62 BPM | TEMPERATURE: 96.9 F | BODY MASS INDEX: 26.48 KG/M2 | HEIGHT: 70 IN | RESPIRATION RATE: 18 BRPM | OXYGEN SATURATION: 99 % | DIASTOLIC BLOOD PRESSURE: 64 MMHG

## 2020-11-12 DIAGNOSIS — Z00.00 ENCOUNTER FOR GENERAL ADULT MEDICAL EXAMINATION W/OUT ABNORMAL FINDINGS: ICD-10-CM

## 2020-11-12 DIAGNOSIS — M41.9 SCOLIOSIS, UNSPECIFIED: ICD-10-CM

## 2020-11-12 DIAGNOSIS — F41.9 ANXIETY DISORDER, UNSPECIFIED: ICD-10-CM

## 2020-11-12 DIAGNOSIS — R00.2 PALPITATIONS: ICD-10-CM

## 2020-11-12 DIAGNOSIS — R05 COUGH: ICD-10-CM

## 2020-11-12 PROCEDURE — 99072 ADDL SUPL MATRL&STAF TM PHE: CPT

## 2020-11-12 PROCEDURE — 71046 X-RAY EXAM CHEST 2 VIEWS: CPT

## 2020-11-12 PROCEDURE — 93000 ELECTROCARDIOGRAM COMPLETE: CPT

## 2020-11-12 PROCEDURE — 99396 PREV VISIT EST AGE 40-64: CPT | Mod: 25

## 2020-11-12 NOTE — ASSESSMENT
[FreeTextEntry1] : Followup for bladder cancer and positive urine cytologies are ongoing. Cholesterol control is satisfactory.. Anxiety is being addressed.

## 2020-11-12 NOTE — HEALTH RISK ASSESSMENT
[HIV test declined] : HIV test declined [Hepatitis C test declined] : Hepatitis C test declined [None] : None [With Family] : lives with family [Retired] : retired [] :  [Feels Safe at Home] : Feels safe at home [Fully functional (bathing, dressing, toileting, transferring, walking, feeding)] : Fully functional (bathing, dressing, toileting, transferring, walking, feeding) [Fully functional (using the telephone, shopping, preparing meals, housekeeping, doing laundry, using] : Fully functional and needs no help or supervision to perform IADLs (using the telephone, shopping, preparing meals, housekeeping, doing laundry, using transportation, managing medications and managing finances) [Reports normal functional visual acuity (ie: able to read med bottle)] : Reports normal functional visual acuity [Smoke Detector] : smoke detector [Carbon Monoxide Detector] : carbon monoxide detector [Safety elements used in home] : safety elements used in home [Seat Belt] :  uses seat belt [Sunscreen] : uses sunscreen [Patient/Caregiver not ready to engage] : Patient/Caregiver not ready to engage [Very Good] : ~his/her~  mood as very good [] : Yes [21-25] : 21-25 [No] : No [No falls in past year] : Patient reported no falls in the past year [0] : 2) Feeling down, depressed, or hopeless: Not at all (0) [de-identified] : Urology [YearQuit] : 1995 [de-identified] : Exercise limited because of chronic lumbosacral back pain [de-identified] : Healthy, watch his weight carefully [KEH0Uppot] : 0 [Change in mental status noted] : No change in mental status noted [Language] : denies difficulty with language [Behavior] : denies difficulty with behavior [Learning/Retaining New Information] : denies difficulty learning/retaining new information [Handling Complex Tasks] : denies difficulty handling complex tasks [Reasoning] : denies difficulty with reasoning [Spatial Ability and Orientation] : denies difficulty with spatial ability and orientation [Sexually Active] : not sexually active [High Risk Behavior] : no high risk behavior [Reports changes in hearing] : Reports no changes in hearing [Reports changes in dental health] : Reports no changes in dental health [Guns at Home] : no guns at home [Travel to Developing Areas] : does not  travel to developing areas [TB Exposure] : is not being exposed to tuberculosis [Caregiver Concerns] : does not have caregiver concerns [AdvancecareDate] : 11/20

## 2020-11-12 NOTE — HISTORY OF PRESENT ILLNESS
[FreeTextEntry1] : Comprehensive annual examination [de-identified] : Feels well. He has been treated for bladder cancer with BCG. He has persisting abnormal urine cytology but investigation of the kidneys ureters and bladder are normal. He will remain under constant observation. Cystoscopy one week ago was negative. CT scan is pending.

## 2020-11-12 NOTE — DATA REVIEWED
[FreeTextEntry1] : Electrocardiogram reveals a regular sinus rhythm and is within normal limits.\par \par Chest x-ray reveals clear lung fields and a normal heart size.There is a moderate scoliosis and moderate to severe kyphosis. There are 2 old rib fractures on the right impression-   no active disease\par \par \par blood work is reviewed and is satisfactory. Urinalysis is normal.\par

## 2020-11-12 NOTE — REVIEW OF SYSTEMS
[Cough] : cough [Nocturia] : nocturia [Back Pain] : back pain [Anxiety] : anxiety [Negative] : Psychiatric [Shortness Of Breath] : no shortness of breath [Wheezing] : no wheezing [Dyspnea on Exertion] : not dyspnea on exertion [FreeTextEntry6] : Occasional cough, no sputum [FreeTextEntry8] : See present illness. Nocturia 1-2 times [FreeTextEntry9] : See present illness

## 2021-11-08 ENCOUNTER — APPOINTMENT (OUTPATIENT)
Dept: INTERNAL MEDICINE | Facility: CLINIC | Age: 66
End: 2021-11-08

## 2021-11-15 ENCOUNTER — APPOINTMENT (OUTPATIENT)
Dept: INTERNAL MEDICINE | Facility: CLINIC | Age: 66
End: 2021-11-15

## 2022-03-30 ENCOUNTER — EMERGENCY (EMERGENCY)
Facility: HOSPITAL | Age: 67
LOS: 1 days | Discharge: HOME | End: 2022-03-30
Attending: EMERGENCY MEDICINE
Payer: COMMERCIAL

## 2022-03-30 VITALS
RESPIRATION RATE: 16 BRPM | DIASTOLIC BLOOD PRESSURE: 68 MMHG | HEART RATE: 82 BPM | OXYGEN SATURATION: 98 % | SYSTOLIC BLOOD PRESSURE: 136 MMHG | TEMPERATURE: 97 F

## 2022-03-30 VITALS
DIASTOLIC BLOOD PRESSURE: 63 MMHG | RESPIRATION RATE: 17 BRPM | TEMPERATURE: 98 F | SYSTOLIC BLOOD PRESSURE: 183 MMHG | HEIGHT: 70 IN | WEIGHT: 179.9 LBS | OXYGEN SATURATION: 100 % | HEART RATE: 94 BPM

## 2022-03-30 LAB
ALBUMIN SERPL ELPH-MCNC: 4.1 G/DL — SIGNIFICANT CHANGE UP (ref 3.5–5)
ALP SERPL-CCNC: 68 U/L — SIGNIFICANT CHANGE UP (ref 40–120)
ALT FLD-CCNC: 34 U/L DA — SIGNIFICANT CHANGE UP (ref 10–60)
ANION GAP SERPL CALC-SCNC: 8 MMOL/L — SIGNIFICANT CHANGE UP (ref 5–17)
APPEARANCE UR: ABNORMAL
AST SERPL-CCNC: 18 U/L — SIGNIFICANT CHANGE UP (ref 10–40)
BACTERIA # UR AUTO: ABNORMAL /HPF
BASOPHILS # BLD AUTO: 0.01 K/UL — SIGNIFICANT CHANGE UP (ref 0–0.2)
BASOPHILS NFR BLD AUTO: 0.1 % — SIGNIFICANT CHANGE UP (ref 0–2)
BILIRUB SERPL-MCNC: 1.2 MG/DL — SIGNIFICANT CHANGE UP (ref 0.2–1.2)
BILIRUB UR-MCNC: NEGATIVE — SIGNIFICANT CHANGE UP
BUN SERPL-MCNC: 16 MG/DL — SIGNIFICANT CHANGE UP (ref 7–18)
CALCIUM SERPL-MCNC: 9.2 MG/DL — SIGNIFICANT CHANGE UP (ref 8.4–10.5)
CHLORIDE SERPL-SCNC: 109 MMOL/L — HIGH (ref 96–108)
CO2 SERPL-SCNC: 24 MMOL/L — SIGNIFICANT CHANGE UP (ref 22–31)
COLOR SPEC: YELLOW — SIGNIFICANT CHANGE UP
COMMENT - URINE: SIGNIFICANT CHANGE UP
CREAT SERPL-MCNC: 1.07 MG/DL — SIGNIFICANT CHANGE UP (ref 0.5–1.3)
DIFF PNL FLD: ABNORMAL
EGFR: 77 ML/MIN/1.73M2 — SIGNIFICANT CHANGE UP
EOSINOPHIL # BLD AUTO: 0 K/UL — SIGNIFICANT CHANGE UP (ref 0–0.5)
EOSINOPHIL NFR BLD AUTO: 0 % — SIGNIFICANT CHANGE UP (ref 0–6)
EPI CELLS # UR: ABNORMAL /HPF
GLUCOSE SERPL-MCNC: 96 MG/DL — SIGNIFICANT CHANGE UP (ref 70–99)
GLUCOSE UR QL: NEGATIVE — SIGNIFICANT CHANGE UP
HCT VFR BLD CALC: 43.1 % — SIGNIFICANT CHANGE UP (ref 39–50)
HGB BLD-MCNC: 15 G/DL — SIGNIFICANT CHANGE UP (ref 13–17)
IMM GRANULOCYTES NFR BLD AUTO: 0.3 % — SIGNIFICANT CHANGE UP (ref 0–1.5)
KETONES UR-MCNC: ABNORMAL
LEUKOCYTE ESTERASE UR-ACNC: ABNORMAL
LIDOCAIN IGE QN: 47 U/L — LOW (ref 73–393)
LYMPHOCYTES # BLD AUTO: 0.55 K/UL — LOW (ref 1–3.3)
LYMPHOCYTES # BLD AUTO: 4.8 % — LOW (ref 13–44)
MCHC RBC-ENTMCNC: 31.8 PG — SIGNIFICANT CHANGE UP (ref 27–34)
MCHC RBC-ENTMCNC: 34.8 GM/DL — SIGNIFICANT CHANGE UP (ref 32–36)
MCV RBC AUTO: 91.3 FL — SIGNIFICANT CHANGE UP (ref 80–100)
MONOCYTES # BLD AUTO: 0.52 K/UL — SIGNIFICANT CHANGE UP (ref 0–0.9)
MONOCYTES NFR BLD AUTO: 4.5 % — SIGNIFICANT CHANGE UP (ref 2–14)
NEUTROPHILS # BLD AUTO: 10.39 K/UL — HIGH (ref 1.8–7.4)
NEUTROPHILS NFR BLD AUTO: 90.3 % — HIGH (ref 43–77)
NITRITE UR-MCNC: NEGATIVE — SIGNIFICANT CHANGE UP
NRBC # BLD: 0 /100 WBCS — SIGNIFICANT CHANGE UP (ref 0–0)
PH UR: 7 — SIGNIFICANT CHANGE UP (ref 5–8)
PLATELET # BLD AUTO: 218 K/UL — SIGNIFICANT CHANGE UP (ref 150–400)
POTASSIUM SERPL-MCNC: 4.1 MMOL/L — SIGNIFICANT CHANGE UP (ref 3.5–5.3)
POTASSIUM SERPL-SCNC: 4.1 MMOL/L — SIGNIFICANT CHANGE UP (ref 3.5–5.3)
PROT SERPL-MCNC: 7.4 G/DL — SIGNIFICANT CHANGE UP (ref 6–8.3)
PROT UR-MCNC: 500 MG/DL
RBC # BLD: 4.72 M/UL — SIGNIFICANT CHANGE UP (ref 4.2–5.8)
RBC # FLD: 12.6 % — SIGNIFICANT CHANGE UP (ref 10.3–14.5)
RBC CASTS # UR COMP ASSIST: ABNORMAL /HPF (ref 0–2)
SODIUM SERPL-SCNC: 141 MMOL/L — SIGNIFICANT CHANGE UP (ref 135–145)
SP GR SPEC: 1.01 — SIGNIFICANT CHANGE UP (ref 1.01–1.02)
UROBILINOGEN FLD QL: NEGATIVE — SIGNIFICANT CHANGE UP
WBC # BLD: 11.5 K/UL — HIGH (ref 3.8–10.5)
WBC # FLD AUTO: 11.5 K/UL — HIGH (ref 3.8–10.5)
WBC UR QL: >50 /HPF (ref 0–5)

## 2022-03-30 PROCEDURE — 85025 COMPLETE CBC W/AUTO DIFF WBC: CPT

## 2022-03-30 PROCEDURE — 51702 INSERT TEMP BLADDER CATH: CPT

## 2022-03-30 PROCEDURE — 96372 THER/PROPH/DIAG INJ SC/IM: CPT | Mod: XU

## 2022-03-30 PROCEDURE — 81001 URINALYSIS AUTO W/SCOPE: CPT

## 2022-03-30 PROCEDURE — 96374 THER/PROPH/DIAG INJ IV PUSH: CPT | Mod: XU

## 2022-03-30 PROCEDURE — 83690 ASSAY OF LIPASE: CPT

## 2022-03-30 PROCEDURE — 36415 COLL VENOUS BLD VENIPUNCTURE: CPT

## 2022-03-30 PROCEDURE — 99284 EMERGENCY DEPT VISIT MOD MDM: CPT | Mod: 25

## 2022-03-30 PROCEDURE — 80053 COMPREHEN METABOLIC PANEL: CPT

## 2022-03-30 PROCEDURE — 99285 EMERGENCY DEPT VISIT HI MDM: CPT

## 2022-03-30 RX ORDER — DIAZEPAM 5 MG
5 TABLET ORAL ONCE
Refills: 0 | Status: DISCONTINUED | OUTPATIENT
Start: 2022-03-30 | End: 2022-03-30

## 2022-03-30 RX ORDER — CEFTRIAXONE 500 MG/1
1000 INJECTION, POWDER, FOR SOLUTION INTRAMUSCULAR; INTRAVENOUS ONCE
Refills: 0 | Status: COMPLETED | OUTPATIENT
Start: 2022-03-30 | End: 2022-03-30

## 2022-03-30 RX ORDER — MORPHINE SULFATE 50 MG/1
4 CAPSULE, EXTENDED RELEASE ORAL ONCE
Refills: 0 | Status: DISCONTINUED | OUTPATIENT
Start: 2022-03-30 | End: 2022-03-30

## 2022-03-30 RX ORDER — CEFUROXIME AXETIL 250 MG
1 TABLET ORAL
Qty: 14 | Refills: 0
Start: 2022-03-30 | End: 2022-04-05

## 2022-03-30 RX ORDER — METHOCARBAMOL 500 MG/1
2 TABLET, FILM COATED ORAL
Qty: 30 | Refills: 0
Start: 2022-03-30 | End: 2022-04-03

## 2022-03-30 RX ORDER — SODIUM CHLORIDE 9 MG/ML
1000 INJECTION INTRAMUSCULAR; INTRAVENOUS; SUBCUTANEOUS ONCE
Refills: 0 | Status: COMPLETED | OUTPATIENT
Start: 2022-03-30 | End: 2022-03-30

## 2022-03-30 RX ADMIN — SODIUM CHLORIDE 1000 MILLILITER(S): 9 INJECTION INTRAMUSCULAR; INTRAVENOUS; SUBCUTANEOUS at 13:36

## 2022-03-30 RX ADMIN — CEFTRIAXONE 100 MILLIGRAM(S): 500 INJECTION, POWDER, FOR SOLUTION INTRAMUSCULAR; INTRAVENOUS at 13:49

## 2022-03-30 RX ADMIN — Medication 5 MILLIGRAM(S): at 13:36

## 2022-03-30 RX ADMIN — MORPHINE SULFATE 4 MILLIGRAM(S): 50 CAPSULE, EXTENDED RELEASE ORAL at 12:14

## 2022-03-30 NOTE — ED ADULT NURSE NOTE - NSIMPLEMENTINTERV_GEN_ALL_ED
Implemented All Universal Safety Interventions:  [place] to call system. Call bell, personal items and telephone within reach. Instruct patient to call for assistance. Room bathroom lighting operational. Non-slip footwear when patient is off stretcher. Physically safe environment: no spills, clutter or unnecessary equipment. Stretcher in lowest position, wheels locked, appropriate side rails in place.

## 2022-03-30 NOTE — ED PROVIDER NOTE - NSICDXPASTMEDICALHX_GEN_ALL_CORE_FT
PAST MEDICAL HISTORY:  Anxiety     BPH (benign prostatic hypertrophy)       Pelvic floor dysfunction     Prostate cancer

## 2022-03-30 NOTE — ED PROVIDER NOTE - OBJECTIVE STATEMENT
67 y/o male, history of prostate cancer and pelvic floor dysfunction, presents w/ lower abdominal pain and rectal pain since the morning. Reports inability to urinate since the morning. Denies any fevers, chills, vomiting. Patient denies any other symptoms. No known drug allergies. 65 y/o male, history of bladder cancer  in remission, pelvic floor dysfunction-levator ani syndrome undergoing PT, presents w/ lower pelvic pain and rectal pain since the morning. Reports inability to urinate since the morning as well. Reports triggers of pain including stress, infection  Denies any fevers, chills, vomiting. Patient denies any other symptoms. No known drug allergies.

## 2022-03-30 NOTE — ED PROVIDER NOTE - PROGRESS NOTE DETAILS
Pt with pain and spasm again, goodwin in place with minimal output. Educated on use of meds for muscle spasm. Pt reports relief with goodwin, morphine. Currently comfortable, discussed plan of care. Pt more comfortable. Educated on results, care and f/u. Educated on med use.

## 2022-03-30 NOTE — ED ADULT NURSE NOTE - DATE OF FIRST COVID-19 BOOSTER
02-Nov-2021 1) PCP Contacted on Admission: (Y) --> Name & Phone #: Dr. Tapia  2) Date of Contact with PCP: 3/16/2020  3) PCP Contacted at Discharge: (Y/N, N/A)  4) Summary of Handoff Given to PCP:   5) Post-Discharge Appointment Date and Location:

## 2022-03-30 NOTE — ED PROVIDER NOTE - NSFOLLOWUPINSTRUCTIONS_ED_ALL_ED_FT
Pelvic Floor Dysfunction       Pelvic floor dysfunction (PFD) is a condition that results when the group of muscles and connective tissues that support the organs in the pelvis (pelvic floor muscles) do not work well. These muscles and their connections form a sling that supports the colon and bladder. In men, these muscles also support the prostate gland. In women, they also support the uterus.    PFD causes pelvic floor muscles to be too weak, too tight, or a combination of both. In PFD, muscle movements are not coordinated. This condition may cause bowel or bladder problems. It may also cause pain.      What are the causes?    This condition may be caused by an injury to the pelvic area or by a weakening of pelvic muscles. This often results from pregnancy and childbirth or other types of strain. In many cases, the exact cause is not known.      What increases the risk?    The following factors may make you more likely to develop this condition:  •Having a condition of chronic bladder tissue inflammation (interstitial cystitis).      •Being an older person.      •Being overweight.      •Radiation treatment for cancer in the pelvic region.      •Previous pelvic surgery, such as removal of the uterus (hysterectomy) or prostate gland (prostatectomy).        What are the signs or symptoms?    Symptoms of this condition vary and may include:•Bladder symptoms, such as:  •Trouble starting urination and emptying the bladder.      •Frequent urinary tract infections.      •Leaking urine when coughing, laughing, or exercising (stress incontinence).      •Having to pass urine urgently or frequently.      •Pain when passing urine.      •Bowel symptoms, such as:  •Constipation.      •Urgent or frequent bowel movements.      •Incomplete bowel movements.      •Painful bowel movements.      •Leaking stool or gas.        •Unexplained genital or rectal pain.      •Genital or rectal muscle spasms.      •Low back pain.      In women, symptoms of PFD may also include:  •A heavy, full, or aching feeling in the vagina.      •A bulge that protrudes into the vagina.      •Pain during or after sexual intercourse.        How is this diagnosed?    This condition may be diagnosed based on:  •Your symptoms and medical history.      •A physical exam. During the exam, your health care provider may check your pelvic muscles for tightness, spasm, pain, or weakness. This may include a rectal exam and a pelvic exam for women.      In some cases, you may have diagnostic tests, such as:  •Electrical muscle function tests.      •Urine flow testing.      •X-ray tests of bowel function.      •Ultrasound of the pelvic organs.        How is this treated?    Treatment for this condition depends on your symptoms. Treatment options include:  •Physical therapy. This may include Kegel exercises to help relax or strengthen the pelvic floor muscles.      •Biofeedback. This type of therapy provides feedback on how tight your pelvic floor muscles are so that you can learn to control them.      •Internal or external massage therapy.      •A treatment that involves electrical stimulation of the pelvic floor muscles to help control pain (transcutaneous electrical nerve stimulation, or TENS).      •Sound wave therapy (ultrasound) to reduce muscle spasms.    •Medicines, such as:  •Muscle relaxants.      •Bladder control medicines.        Surgery to reconstruct or support pelvic floor muscles may be an option if other treatments do not help.      Follow these instructions at home:    Activity     •Do your usual activities as told by your health care provider. Ask your health care provider if you should modify any activities.      •Do pelvic floor strengthening or relaxing exercises at home as told by your physical therapist.      Lifestyle     •Maintain a healthy weight.      •Eat foods that are high in fiber, such as beans, whole grains, and fresh fruits and vegetables.      •Limit foods that are high in fat and processed sugars, such as fried or sweet foods.      •Manage stress with relaxation techniques such as yoga or meditation.      General instructions   •If you have problems with leakage:  •Use absorbable pads or wear padded underwear.      •Wash frequently with mild soap.      •Keep your genital and anal area as clean and dry as possible.      •Ask your health care provider if you should try a barrier cream to prevent skin irritation.        •Take warm baths to relieve pelvic muscle tension or spasms.      •Take over-the-counter and prescription medicines only as told by your health care provider.      •Keep all follow-up visits as told by your health care provider. This is important.        Contact a health care provider if you:    •Are not improving with home care.      •Have signs or symptoms of PFD that get worse at home.      •Develop new signs or symptoms at home.    •Have signs of a urinary tract infection, such as:  •Fever.      •Chills.      •Urinary frequency.      •A burning feeling when urinating.        •Have not had a bowel movement in 3 days (constipation).        Summary    •Pelvic floor dysfunction results when the muscles and connective tissues in your pelvic floor do not work well.      •These muscles and their connections form a sling that supports your colon and bladder. In men, these muscles also support the prostate gland. In women, they also support the uterus.      •PFD may be caused by an injury to the pelvic area or by a weakening of pelvic muscles.      •PFD causes pelvic floor muscles to be too weak, too tight, or a combination of both. Symptoms may vary from person to person.      •In most cases, PFD can be treated with physical therapies and medicines. Surgery may be an option if other treatments do not help.      This information is not intended to replace advice given to you by your health care provider. Make sure you discuss any questions you have with your health care provider.    Urinary Tract Infection in Men    WHAT YOU NEED TO KNOW:    What is a urinary tract infection (UTI)? A UTI is caused by bacteria that get inside your urinary tract. Most bacteria that enter your urinary tract come out when you urinate. If the bacteria stay in your urinary tract, you may get an infection. Your urinary tract includes your kidneys, ureters, bladder, and urethra. Urine is made in your kidneys, and it flows from the ureters to the bladder. Urine leaves the bladder through the urethra. A UTI is more common in your lower urinary tract, which includes your bladder and urethra.           What increases my risk for a UTI?   •A urinary catheter or self-catheterization      •Incontinence (not able to control when you urinate)      •Urinary tract problems, such as narrowing, kidney stones, or not being able to empty your bladder completely      •Not being circumcised      •Past UTI or urinary tract surgery      •Elderly age      •Obesity or diabetes      What are the signs and symptoms of a UTI?   •Urinating more often than usual, leaking urine, or waking from sleep to urinate      •Pain or burning when you urinate      •Pain or pressure in your lower abdomen or back      •Urine that smells bad      •Blood in your urine      How is a UTI diagnosed? Your healthcare provider will ask about your signs and symptoms. Your provider may press on your abdomen, sides, and back to check if you feel pain. Your urine will be tested for bacteria that may be causing your infection. If you have UTIs often, you may need other tests to find the cause.     How is a UTI treated?   •Antibiotics help fight a bacterial infection.       •Medicines may be given to decrease pain and burning when you urinate. They will also help decrease the feeling that you need to urinate often. These medicines will make your urine orange or red.      What can I do to prevent a UTI?   •Empty your bladder often. Urinate and empty your bladder as soon as you feel the need. Do not hold your urine for long periods of time.      •Drink liquids as directed. Ask how much liquid to drink each day and which liquids are best for you. You may need to drink more liquids than usual to help flush out the bacteria. Do not drink alcohol, caffeine, or citrus juices. These can irritate your bladder and increase your symptoms. Your healthcare provider may recommend cranberry juice to help prevent a UTI.      •Urinate after you have sex. This can help flush out bacteria passed during sex.      •Do pelvic muscle exercises often. Pelvic muscle exercises may help you start and stop urinating. Strong pelvic muscles may help you empty your bladder easier. Squeeze these muscles tightly for 5 seconds like you are trying to hold back urine. Then relax for 5 seconds. Gradually work up to squeezing for 10 seconds. Do 3 sets of 15 repetitions a day, or as directed.      When should I seek immediate care?   •You are urinating very little or not at all.      •You have a high fever with shaking chills.       •You have side or back pain that gets worse.      When should I contact my healthcare provider?   •You have a mild fever.      •You do not feel better after 2 days of taking antibiotics.      •You are vomiting.       •You have new symptoms, such as blood or pus in your urine.       •You have questions or concerns about your condition or care.      CARE AGREEMENT:    You have the right to help plan your care. Learn about your health condition and how it may be treated. Discuss treatment options with your healthcare providers to decide what care you want to receive. You always have the right to refuse treatment

## 2022-03-30 NOTE — ED PROVIDER NOTE - CLINICAL SUMMARY MEDICAL DECISION MAKING FREE TEXT BOX
67 y/o male w/ pelvic floor dysfunction and abdominal pain. Will place [place], check UA, and reassess.

## 2022-03-30 NOTE — ED ADULT TRIAGE NOTE - CCCP TRG CHIEF CMPLNT
urinary retention
Additional Notes: Apply Vaseline or aquaphor QHS over medicine
Detail Level: Simple

## 2022-03-30 NOTE — ED PROVIDER NOTE - PATIENT PORTAL LINK FT
You can access the FollowMyHealth Patient Portal offered by [facility] by registering at the following website: [email or web address]. By joining [employer]’s FollowMyHealth portal, you will also be able to view your health information using other applications (apps) compatible with our system.

## 2022-03-31 ENCOUNTER — EMERGENCY (EMERGENCY)
Facility: HOSPITAL | Age: 67
LOS: 1 days | Discharge: HOME | End: 2022-03-31
Attending: EMERGENCY MEDICINE
Payer: COMMERCIAL

## 2022-03-31 VITALS
SYSTOLIC BLOOD PRESSURE: 127 MMHG | HEART RATE: 95 BPM | OXYGEN SATURATION: 99 % | WEIGHT: 184.97 LBS | RESPIRATION RATE: 17 BRPM | HEIGHT: 70 IN | DIASTOLIC BLOOD PRESSURE: 83 MMHG | TEMPERATURE: 98 F

## 2022-03-31 PROBLEM — C61 MALIGNANT NEOPLASM OF PROSTATE: Chronic | Status: ACTIVE | Noted: 2022-03-30

## 2022-03-31 PROBLEM — M62.89 OTHER SPECIFIED DISORDERS OF MUSCLE: Chronic | Status: ACTIVE | Noted: 2022-03-30

## 2022-03-31 LAB
ALBUMIN SERPL ELPH-MCNC: 4 G/DL — SIGNIFICANT CHANGE UP (ref 3.5–5)
ALP SERPL-CCNC: 59 U/L — SIGNIFICANT CHANGE UP (ref 40–120)
ALT FLD-CCNC: 28 U/L DA — SIGNIFICANT CHANGE UP (ref 10–60)
ANION GAP SERPL CALC-SCNC: 6 MMOL/L — SIGNIFICANT CHANGE UP (ref 5–17)
APPEARANCE UR: ABNORMAL
AST SERPL-CCNC: 16 U/L — SIGNIFICANT CHANGE UP (ref 10–40)
BILIRUB SERPL-MCNC: 0.9 MG/DL — SIGNIFICANT CHANGE UP (ref 0.2–1.2)
BILIRUB UR-MCNC: ABNORMAL
BUN SERPL-MCNC: 20 MG/DL — HIGH (ref 7–18)
CALCIUM SERPL-MCNC: 9 MG/DL — SIGNIFICANT CHANGE UP (ref 8.4–10.5)
CHLORIDE SERPL-SCNC: 107 MMOL/L — SIGNIFICANT CHANGE UP (ref 96–108)
CO2 SERPL-SCNC: 26 MMOL/L — SIGNIFICANT CHANGE UP (ref 22–31)
COLOR SPEC: ABNORMAL
CREAT SERPL-MCNC: 0.95 MG/DL — SIGNIFICANT CHANGE UP (ref 0.5–1.3)
DIFF PNL FLD: ABNORMAL
EGFR: 89 ML/MIN/1.73M2 — SIGNIFICANT CHANGE UP
GLUCOSE SERPL-MCNC: 90 MG/DL — SIGNIFICANT CHANGE UP (ref 70–99)
GLUCOSE UR QL: NEGATIVE — SIGNIFICANT CHANGE UP
HCT VFR BLD CALC: 40.5 % — SIGNIFICANT CHANGE UP (ref 39–50)
HGB BLD-MCNC: 14 G/DL — SIGNIFICANT CHANGE UP (ref 13–17)
KETONES UR-MCNC: ABNORMAL
LEUKOCYTE ESTERASE UR-ACNC: ABNORMAL
MCHC RBC-ENTMCNC: 31.9 PG — SIGNIFICANT CHANGE UP (ref 27–34)
MCHC RBC-ENTMCNC: 34.6 GM/DL — SIGNIFICANT CHANGE UP (ref 32–36)
MCV RBC AUTO: 92.3 FL — SIGNIFICANT CHANGE UP (ref 80–100)
NITRITE UR-MCNC: POSITIVE
NRBC # BLD: 0 /100 WBCS — SIGNIFICANT CHANGE UP (ref 0–0)
PH UR: 6 — SIGNIFICANT CHANGE UP (ref 5–8)
PLATELET # BLD AUTO: 197 K/UL — SIGNIFICANT CHANGE UP (ref 150–400)
POTASSIUM SERPL-MCNC: 4 MMOL/L — SIGNIFICANT CHANGE UP (ref 3.5–5.3)
POTASSIUM SERPL-SCNC: 4 MMOL/L — SIGNIFICANT CHANGE UP (ref 3.5–5.3)
PROT SERPL-MCNC: 6.7 G/DL — SIGNIFICANT CHANGE UP (ref 6–8.3)
PROT UR-MCNC: 100
RBC # BLD: 4.39 M/UL — SIGNIFICANT CHANGE UP (ref 4.2–5.8)
RBC # FLD: 12.9 % — SIGNIFICANT CHANGE UP (ref 10.3–14.5)
SODIUM SERPL-SCNC: 139 MMOL/L — SIGNIFICANT CHANGE UP (ref 135–145)
SP GR SPEC: 1.02 — SIGNIFICANT CHANGE UP (ref 1.01–1.02)
UROBILINOGEN FLD QL: 8
WBC # BLD: 9.04 K/UL — SIGNIFICANT CHANGE UP (ref 3.8–10.5)
WBC # FLD AUTO: 9.04 K/UL — SIGNIFICANT CHANGE UP (ref 3.8–10.5)

## 2022-03-31 PROCEDURE — 36415 COLL VENOUS BLD VENIPUNCTURE: CPT

## 2022-03-31 PROCEDURE — 81001 URINALYSIS AUTO W/SCOPE: CPT

## 2022-03-31 PROCEDURE — 99283 EMERGENCY DEPT VISIT LOW MDM: CPT

## 2022-03-31 PROCEDURE — 99284 EMERGENCY DEPT VISIT MOD MDM: CPT

## 2022-03-31 PROCEDURE — 85027 COMPLETE CBC AUTOMATED: CPT

## 2022-03-31 PROCEDURE — 80053 COMPREHEN METABOLIC PANEL: CPT

## 2022-03-31 NOTE — ED PROVIDER NOTE - PATIENT PORTAL LINK FT
You can access the FollowMyHealth Patient Portal offered by Health system by registering at the following website: [email or web address]. By joining [employer]’s FollowMyHealth portal, you will also be able to view your health information using other applications (apps) compatible with our system.

## 2022-03-31 NOTE — ED PROVIDER NOTE - NSFOLLOWUPINSTRUCTIONS_ED_ALL_ED_FT
EnglishSpanish                                                                                            Acute Urinary Retention, Male       Acute urinary retention is when a person cannot pee (urinate) at all, or can only pee a little. This can come on all of a sudden. If it is not treated, it can lead to kidney problems or other serious problems.      What are the causes?    •A problem with the tube that drains the bladder (urethra).      •Problems with the nerves in the bladder.      •Tumors.      •Certain medicines.      •An infection.      •Having trouble pooping (constipation).        What increases the risk?    Older men are more at risk because their prostate gland may become larger as they age. Other conditions also can increase risk. These include:  •Diseases, such as multiple sclerosis.      •Injury to the spinal cord.      •Diabetes.      •A condition that affects the way the brain works, such as dementia.      •Holding back urine due to trauma or because you do not want to use the bathroom.        What are the signs or symptoms?    •Trouble peeing.      •Pain in the lower belly.        How is this treated?    Treatment for this condition may include:  •Medicines.      •Placing a thin, germ-free tube (catheter) into the bladder to drain pee out of the body.      •Therapy to treat mental health conditions.      •Treatment for conditions that may cause this.      If needed, you may be treated in the hospital for kidney problems or to manage other problems.      Follow these instructions at home:    Medicines     •Take over-the-counter and prescription medicines only as told by your doctor. Ask your doctor what medicines you should stay away from.       •If you were given an antibiotic medicine, take it as told by your doctor. Do not stop taking it, even if you start to feel better.      General instructions     • Do not smoke or use any products that contain nicotine or tobacco. If you need help quitting, ask your doctor.      •Drink enough fluid to keep your pee pale yellow.      •If you were sent home with a tube that drains the bladder, take care of it as told by your doctor.      •Watch for changes in your symptoms. Tell your doctor about them.      •If told, keep track of changes in your blood pressure at home. Tell your doctor about them.      •Keep all follow-up visits.        Contact a doctor if:    •You have spasms in your bladder that you cannot stop.      •You leak pee when you have spasms.        Get help right away if:    •You have chills or a fever.      •You have blood in your pee.    •You have a tube that drains pee from the bladder and these things happen:  •The tube stops draining pee.      •The tube falls out.          Summary    •Acute urinary retention is when you cannot pee at all or you pee too little.      •If this condition is not treated, it can lead to kidney problems or other serious problems.      •If you were sent home with a tube (catheter) that drains the bladder, take care of it as told by your doctor.      •Watch for changes in your symptoms. Tell your doctor about them.      This information is not intended to replace advice given to you by your health care provider. Make sure you discuss any questions you have with your health care provider.

## 2022-04-25 ENCOUNTER — APPOINTMENT (OUTPATIENT)
Dept: UROLOGY | Facility: CLINIC | Age: 67
End: 2022-04-25
Payer: COMMERCIAL

## 2022-04-25 VITALS
BODY MASS INDEX: 26.34 KG/M2 | OXYGEN SATURATION: 99 % | DIASTOLIC BLOOD PRESSURE: 85 MMHG | WEIGHT: 184 LBS | TEMPERATURE: 97.6 F | HEIGHT: 70 IN | SYSTOLIC BLOOD PRESSURE: 127 MMHG | HEART RATE: 70 BPM

## 2022-04-25 DIAGNOSIS — M62.89 OTHER SPECIFIED DISORDERS OF MUSCLE: ICD-10-CM

## 2022-04-25 DIAGNOSIS — N39.0 URINARY TRACT INFECTION, SITE NOT SPECIFIED: ICD-10-CM

## 2022-04-25 DIAGNOSIS — R33.8 OTHER RETENTION OF URINE: ICD-10-CM

## 2022-04-25 DIAGNOSIS — C67.9 MALIGNANT NEOPLASM OF BLADDER, UNSPECIFIED: ICD-10-CM

## 2022-04-25 PROCEDURE — 99204 OFFICE O/P NEW MOD 45 MIN: CPT

## 2022-04-30 ENCOUNTER — TRANSCRIPTION ENCOUNTER (OUTPATIENT)
Age: 67
End: 2022-04-30

## 2022-05-03 PROBLEM — C67.9 BLADDER CANCER: Status: ACTIVE | Noted: 2019-08-01

## 2022-05-03 PROBLEM — R33.8 ACUTE URINARY RETENTION: Status: ACTIVE | Noted: 2022-05-03

## 2022-05-03 PROBLEM — M62.89 PELVIC FLOOR DYSFUNCTION: Status: ACTIVE | Noted: 2022-05-03

## 2022-05-03 LAB
APPEARANCE: CLEAR
BACTERIA UR CULT: NORMAL
BACTERIA: ABNORMAL
BILIRUBIN URINE: NEGATIVE
BLOOD URINE: NEGATIVE
COLOR: YELLOW
GLUCOSE QUALITATIVE U: ABNORMAL
HYALINE CASTS: 0 /LPF
KETONES URINE: NORMAL
LEUKOCYTE ESTERASE URINE: ABNORMAL
MICROSCOPIC-UA: NORMAL
NITRITE URINE: NEGATIVE
PH URINE: 6
PROTEIN URINE: ABNORMAL
RED BLOOD CELLS URINE: 3 /HPF
SPECIFIC GRAVITY URINE: 1.03
SQUAMOUS EPITHELIAL CELLS: 4 /HPF
UROBILINOGEN URINE: NORMAL
WHITE BLOOD CELLS URINE: 98 /HPF

## 2022-05-03 NOTE — HISTORY OF PRESENT ILLNESS
[FreeTextEntry1] : 67 yo M presents with history of pelvic floor dysfunction - levator ani syndrome\par Complicated urologic history\par In 2012 - dysuria, severe irritative LUTS, intermittent\par Saw Dr. Dong at that time - started on tamsulosin but symptoms didn't improve\par Went to another urologist - changed to rapaflo, no change in symptoms\par 3rd urologist Dr. Daley - dysuria, pelvic pressure, attributed to prostate\par 4th urologist Dr. Mensah - poss OAB and prescribed vesicare which didn't help\par Told by Dr. Mensah that poss pelvic floor dysfunction\par Saw physiotrist and spine surgeon at [facility] - normal MRI\par Referred to a neurologist - started on cymbalta which didn't work\par Went to [facility] [place] (Dr. Ly) - ?chronic bacterial cystitis and abx didn't help. At that time, abnormal cytology\par Ultimately biopsied and diagnosed with bladder CIS s/p BCG induction\par Pt is pending cysto on 5/10\par Presents with history of UTI about 2-3 weeks ago\par At the time, found to be in urinary retention and had indwelling catheter placed\par Went back the next day to ER due to pelvic pain. Catheter was removed and pt passed TOV\par Feeling better today with no bothersome urinary symptoms\par

## 2022-05-03 NOTE — ASSESSMENT
[FreeTextEntry1] : 65 yo M with bladder cancer (CIS, pelvic floor dysfunction, recent UTI and urinary retention\par \par - PVR = 0ml\par - Continue surveillance cysto with MST\par - OK to continue PT for pelvic floor dysfunction\par - discussed possible etiologies for UTI. Spent extensive period of time discussed behavioral modification including adequate hydration, cutting back on caffeine intake, timed voiding during the day, importance of controlling any diabetes and chronic constipation and how all of these things could potentially increase risk for persistent or recurrent UTI.\par - Repeat UA and culture

## 2022-05-03 NOTE — REVIEW OF SYSTEMS
[both] : pain during and after intercourse [denies] : denies pain with orgasm [base] : pain in base of penis [Urine Infection (bladder/kidney)] : bladder/kidney infection [Pain during urination] : pain during urination [Pain at onset of urination] : pain at onset of urination [Pain after urination] : pain after urination [Urine retention] : urine retention [Strong urge to urinate] : strong urge to urinate [Strain or push to urinate] : strain or push to urinate [Wait a long time to urinate] : waits a long time to urinate [Bladder fullness after urinating] : bladder fullness after urinating [Negative] : Heme/Lymph [FreeTextEntry2] : bladder cancer 2019

## 2022-07-24 ENCOUNTER — EMERGENCY (EMERGENCY)
Facility: HOSPITAL | Age: 67
LOS: 1 days | Discharge: HOME | End: 2022-07-24
Attending: EMERGENCY MEDICINE
Payer: COMMERCIAL

## 2022-07-24 VITALS
WEIGHT: 184.97 LBS | OXYGEN SATURATION: 96 % | HEART RATE: 80 BPM | RESPIRATION RATE: 18 BRPM | HEIGHT: 70 IN | DIASTOLIC BLOOD PRESSURE: 75 MMHG | SYSTOLIC BLOOD PRESSURE: 127 MMHG | TEMPERATURE: 97 F

## 2022-07-24 LAB
ALBUMIN SERPL ELPH-MCNC: 3.7 G/DL — SIGNIFICANT CHANGE UP (ref 3.5–5)
ALP SERPL-CCNC: 67 U/L — SIGNIFICANT CHANGE UP (ref 40–120)
ALT FLD-CCNC: 41 U/L DA — SIGNIFICANT CHANGE UP (ref 10–60)
ANION GAP SERPL CALC-SCNC: 9 MMOL/L — SIGNIFICANT CHANGE UP (ref 5–17)
APPEARANCE UR: CLEAR — SIGNIFICANT CHANGE UP
AST SERPL-CCNC: 27 U/L — SIGNIFICANT CHANGE UP (ref 10–40)
BASOPHILS # BLD AUTO: 0.01 K/UL — SIGNIFICANT CHANGE UP (ref 0–0.2)
BASOPHILS NFR BLD AUTO: 0.2 % — SIGNIFICANT CHANGE UP (ref 0–2)
BILIRUB SERPL-MCNC: 0.8 MG/DL — SIGNIFICANT CHANGE UP (ref 0.2–1.2)
BILIRUB UR-MCNC: NEGATIVE — SIGNIFICANT CHANGE UP
BUN SERPL-MCNC: 21 MG/DL — HIGH (ref 7–18)
CALCIUM SERPL-MCNC: 9 MG/DL — SIGNIFICANT CHANGE UP (ref 8.4–10.5)
CHLORIDE SERPL-SCNC: 107 MMOL/L — SIGNIFICANT CHANGE UP (ref 96–108)
CO2 SERPL-SCNC: 24 MMOL/L — SIGNIFICANT CHANGE UP (ref 22–31)
COLOR SPEC: YELLOW — SIGNIFICANT CHANGE UP
CREAT SERPL-MCNC: 0.89 MG/DL — SIGNIFICANT CHANGE UP (ref 0.5–1.3)
DIFF PNL FLD: ABNORMAL
EGFR: 95 ML/MIN/1.73M2 — SIGNIFICANT CHANGE UP
EOSINOPHIL # BLD AUTO: 0.02 K/UL — SIGNIFICANT CHANGE UP (ref 0–0.5)
EOSINOPHIL NFR BLD AUTO: 0.4 % — SIGNIFICANT CHANGE UP (ref 0–6)
GLUCOSE SERPL-MCNC: 89 MG/DL — SIGNIFICANT CHANGE UP (ref 70–99)
GLUCOSE UR QL: NEGATIVE — SIGNIFICANT CHANGE UP
HCT VFR BLD CALC: 40.8 % — SIGNIFICANT CHANGE UP (ref 39–50)
HGB BLD-MCNC: 13.8 G/DL — SIGNIFICANT CHANGE UP (ref 13–17)
IMM GRANULOCYTES NFR BLD AUTO: 0.2 % — SIGNIFICANT CHANGE UP (ref 0–1.5)
KETONES UR-MCNC: ABNORMAL
LEUKOCYTE ESTERASE UR-ACNC: ABNORMAL
LYMPHOCYTES # BLD AUTO: 0.89 K/UL — LOW (ref 1–3.3)
LYMPHOCYTES # BLD AUTO: 17.3 % — SIGNIFICANT CHANGE UP (ref 13–44)
MCHC RBC-ENTMCNC: 31.6 PG — SIGNIFICANT CHANGE UP (ref 27–34)
MCHC RBC-ENTMCNC: 33.8 GM/DL — SIGNIFICANT CHANGE UP (ref 32–36)
MCV RBC AUTO: 93.4 FL — SIGNIFICANT CHANGE UP (ref 80–100)
MONOCYTES # BLD AUTO: 0.53 K/UL — SIGNIFICANT CHANGE UP (ref 0–0.9)
MONOCYTES NFR BLD AUTO: 10.3 % — SIGNIFICANT CHANGE UP (ref 2–14)
NEUTROPHILS # BLD AUTO: 3.68 K/UL — SIGNIFICANT CHANGE UP (ref 1.8–7.4)
NEUTROPHILS NFR BLD AUTO: 71.6 % — SIGNIFICANT CHANGE UP (ref 43–77)
NITRITE UR-MCNC: NEGATIVE — SIGNIFICANT CHANGE UP
NRBC # BLD: 0 /100 WBCS — SIGNIFICANT CHANGE UP (ref 0–0)
PH UR: 6.5 — SIGNIFICANT CHANGE UP (ref 5–8)
PLATELET # BLD AUTO: 189 K/UL — SIGNIFICANT CHANGE UP (ref 150–400)
POTASSIUM SERPL-MCNC: 3.9 MMOL/L — SIGNIFICANT CHANGE UP (ref 3.5–5.3)
POTASSIUM SERPL-SCNC: 3.9 MMOL/L — SIGNIFICANT CHANGE UP (ref 3.5–5.3)
PROT SERPL-MCNC: 6.6 G/DL — SIGNIFICANT CHANGE UP (ref 6–8.3)
PROT UR-MCNC: 100
RBC # BLD: 4.37 M/UL — SIGNIFICANT CHANGE UP (ref 4.2–5.8)
RBC # FLD: 13 % — SIGNIFICANT CHANGE UP (ref 10.3–14.5)
SODIUM SERPL-SCNC: 140 MMOL/L — SIGNIFICANT CHANGE UP (ref 135–145)
SP GR SPEC: 1.01 — SIGNIFICANT CHANGE UP (ref 1.01–1.02)
UROBILINOGEN FLD QL: NEGATIVE — SIGNIFICANT CHANGE UP
WBC # BLD: 5.14 K/UL — SIGNIFICANT CHANGE UP (ref 3.8–10.5)
WBC # FLD AUTO: 5.14 K/UL — SIGNIFICANT CHANGE UP (ref 3.8–10.5)

## 2022-07-24 PROCEDURE — 74177 CT ABD & PELVIS W/CONTRAST: CPT | Mod: 26,MA

## 2022-07-24 PROCEDURE — 85025 COMPLETE CBC W/AUTO DIFF WBC: CPT

## 2022-07-24 PROCEDURE — 36415 COLL VENOUS BLD VENIPUNCTURE: CPT

## 2022-07-24 PROCEDURE — 96375 TX/PRO/DX INJ NEW DRUG ADDON: CPT

## 2022-07-24 PROCEDURE — 96361 HYDRATE IV INFUSION ADD-ON: CPT

## 2022-07-24 PROCEDURE — 74177 CT ABD & PELVIS W/CONTRAST: CPT | Mod: MA

## 2022-07-24 PROCEDURE — 96374 THER/PROPH/DIAG INJ IV PUSH: CPT | Mod: XU

## 2022-07-24 PROCEDURE — 99284 EMERGENCY DEPT VISIT MOD MDM: CPT | Mod: 25

## 2022-07-24 PROCEDURE — 81001 URINALYSIS AUTO W/SCOPE: CPT

## 2022-07-24 PROCEDURE — 87086 URINE CULTURE/COLONY COUNT: CPT

## 2022-07-24 PROCEDURE — 80053 COMPREHEN METABOLIC PANEL: CPT

## 2022-07-24 PROCEDURE — 99285 EMERGENCY DEPT VISIT HI MDM: CPT

## 2022-07-24 RX ORDER — CEFUROXIME AXETIL 250 MG
1 TABLET ORAL
Qty: 14 | Refills: 0
Start: 2022-07-24 | End: 2022-07-30

## 2022-07-24 RX ORDER — SODIUM CHLORIDE 9 MG/ML
1000 INJECTION INTRAMUSCULAR; INTRAVENOUS; SUBCUTANEOUS ONCE
Refills: 0 | Status: COMPLETED | OUTPATIENT
Start: 2022-07-24 | End: 2022-07-24

## 2022-07-24 RX ORDER — MORPHINE SULFATE 50 MG/1
4 CAPSULE, EXTENDED RELEASE ORAL ONCE
Refills: 0 | Status: DISCONTINUED | OUTPATIENT
Start: 2022-07-24 | End: 2022-07-24

## 2022-07-24 RX ORDER — PHENAZOPYRIDINE HCL 100 MG
200 TABLET ORAL ONCE
Refills: 0 | Status: COMPLETED | OUTPATIENT
Start: 2022-07-24 | End: 2022-07-24

## 2022-07-24 RX ORDER — CEFTRIAXONE 500 MG/1
1000 INJECTION, POWDER, FOR SOLUTION INTRAMUSCULAR; INTRAVENOUS ONCE
Refills: 0 | Status: COMPLETED | OUTPATIENT
Start: 2022-07-24 | End: 2022-07-24

## 2022-07-24 RX ORDER — PHENAZOPYRIDINE HCL 100 MG
1 TABLET ORAL
Qty: 5 | Refills: 0
Start: 2022-07-24 | End: 2022-07-25

## 2022-07-24 RX ADMIN — MORPHINE SULFATE 4 MILLIGRAM(S): 50 CAPSULE, EXTENDED RELEASE ORAL at 08:29

## 2022-07-24 RX ADMIN — SODIUM CHLORIDE 1000 MILLILITER(S): 9 INJECTION INTRAMUSCULAR; INTRAVENOUS; SUBCUTANEOUS at 08:31

## 2022-07-24 RX ADMIN — MORPHINE SULFATE 4 MILLIGRAM(S): 50 CAPSULE, EXTENDED RELEASE ORAL at 08:59

## 2022-07-24 RX ADMIN — Medication 200 MILLIGRAM(S): at 10:52

## 2022-07-24 RX ADMIN — CEFTRIAXONE 100 MILLIGRAM(S): 500 INJECTION, POWDER, FOR SOLUTION INTRAMUSCULAR; INTRAVENOUS at 10:52

## 2022-07-24 RX ADMIN — CEFTRIAXONE 1000 MILLIGRAM(S): 500 INJECTION, POWDER, FOR SOLUTION INTRAMUSCULAR; INTRAVENOUS at 11:22

## 2022-07-24 RX ADMIN — SODIUM CHLORIDE 1000 MILLILITER(S): 9 INJECTION INTRAMUSCULAR; INTRAVENOUS; SUBCUTANEOUS at 09:31

## 2022-07-24 NOTE — ED PROVIDER NOTE - NSFOLLOWUPINSTRUCTIONS_ED_ALL_ED_FT
URINARY TRACT INFECTION IN MEN - AfterCare(R) Instructions(ER/ED)           Urinary Tract Infection in Men    WHAT YOU NEED TO KNOW:    A urinary tract infection (UTI) is caused by bacteria that get inside your urinary tract. Your urinary tract includes your kidneys, ureters, bladder, and urethra. A UTI is more common in your lower urinary tract, which includes your bladder and urethra.           DISCHARGE INSTRUCTIONS:    Return to the emergency department if:   •You are urinating very little or not at all.      •You have a high fever with shaking chills.      •You have side or back pain that gets worse.      Call your doctor if:   •You have a fever.      •You do not feel better after 2 days of taking antibiotics.      •You are vomiting.      •You have new symptoms, such as blood or pus in your urine.      •You have questions or concerns about your condition or care.      Medicines:   •Antibiotics treat a bacterial infection.      •Medicines may be given to decrease pain and burning when you urinate. They will also help decrease the feeling that you need to urinate often. These medicines may make your urine orange or red.      •Take your medicine as directed. Contact your healthcare provider if you think your medicine is not helping or if you have side effects. Tell him or her if you are allergic to any medicine. Keep a list of the medicines, vitamins, and herbs you take. Include the amounts, and when and why you take them. Bring the list or the pill bottles to follow-up visits. Carry your medicine list with you in case of an emergency.      Prevent another UTI:   •Empty your bladder often. Urinate and empty your bladder as soon as you feel the need. Do not hold your urine for long periods of time.      •Drink liquids as directed. Ask how much liquid to drink each day and which liquids are best for you. You may need to drink more liquids than usual to help flush out the bacteria. Do not drink alcohol, caffeine, or citrus juices. These can irritate your bladder and increase your symptoms. Your healthcare provider may recommend cranberry juice to help prevent a UTI.      •Urinate after you have sex. This can help flush out bacteria passed during sex.      •Do pelvic muscle exercises often. Pelvic muscle exercises may help you start and stop urinating. Strong pelvic muscles may help you empty your bladder easier. Squeeze these muscles tightly for 5 seconds like you are trying to hold back urine. Then relax for 5 seconds. Gradually work up to squeezing for 10 seconds. Do 3 sets of 15 repetitions a day, or as directed.      Follow up with your doctor as directed: Write down your questions so you remember to ask them during your visits.       © Copyright [employer] 2022           back to top                          © Copyright [employer] 2022

## 2022-07-24 NOTE — ED PROVIDER NOTE - CLINICAL SUMMARY MEDICAL DECISION MAKING FREE TEXT BOX
65 y/o man, h/o bladder cancer with spread to prostate, recently receiving BCG treatment with his urologist (most recent Tx 2 days ago), c/o 1-2 days of difficulty urinating and suprapubic pain--labs, UA, UCx, reassess.

## 2022-07-24 NOTE — ED PROVIDER NOTE - OBJECTIVE STATEMENT
67 y/o man, h/o bladder cancer with spread to prostate, recently receiving BCG treatment with his urologist (most recent Tx 2 days ago), c/o 1-2 days of difficulty urinating and suprapubic pain.  No fever/chills/hematuria/N/V.

## 2022-07-24 NOTE — ED PROVIDER NOTE - NSICDXPASTMEDICALHX_GEN_ALL_CORE_FT
PAST MEDICAL HISTORY:  Anxiety     Bladder cancer     BPH (benign prostatic hypertrophy)     Pelvic floor dysfunction     Prostate cancer

## 2022-07-24 NOTE — ED PROVIDER NOTE - PATIENT PORTAL LINK FT
You can access the FollowMyHealth Patient Portal offered by [facility] by registering at the following website: [email or web address]. By joining [facility]’s FollowMyHealth portal, you will also be able to view your health information using other applications (apps) compatible with our system.

## 2022-07-25 LAB
CULTURE RESULTS: NO GROWTH — SIGNIFICANT CHANGE UP
SPECIMEN SOURCE: SIGNIFICANT CHANGE UP

## 2022-11-30 NOTE — DISCHARGE NOTE ADULT - SECONDARY DIAGNOSIS.
11/30/22 1307   Post-Acute Status   Post-Acute Authorization HME   HME Status Set-up Complete/Auth obtained     [employer] delivered and fit to patient    Anxiety

## 2024-04-16 NOTE — ED ADULT NURSE NOTE - WHEN FALL OCCURRED
No longer requires brace, has been discharged by ortho to return as needed  Pain controlled with acetaminophen PRN    this admission

## 2024-12-09 NOTE — H&P ADULT - PROBLEM SELECTOR PROBLEM 4
[facility] Medicine                                   Advance Care Planning Note    Name: Tiera Iraheta  YOB: 1930  MRN: [id number]  Admission Date: 12/9/2024 10:11 AM    Date of discussion: 12/9/2024    Active Diagnoses:        These active diagnoses are of sufficient risk that focused discussion on advance care planning is indicated in order to allow the patient to thoughtfully consider personal goals of care, and if situations arise that prevent the ability to personally give input, to ensure appropriate representation of their personal desires for different levels and aggressiveness of care.     Discussion:     Persons present and participating in discussion: Tiera Iraheta, Tobi Mccord MD,     Topics Discussed:  Patient's medical condition and diagnosis: [ x ] yes [  ] no   Surrogate decision maker: [  x] yes [  ] no   Patient's current physical function/cognitive function/frailty: [  x] yes [  ] no   Code Status: [ x ] yes [  ] no   Artificial Nutrition / Dialysis / Non-Invasive Ventilation / Blood Transfusion: [  x] yes [  ] no  Potential Resources for home (durable medical equipment, home nursing, home O2): [x  ] yes [  ] no    Overview of Discussion: Full code    Time Spent:     Total time spent face-to-face in education and discussion: 16 min  minutes.     Tobi Mccord MD  Date of Service:  12/9/2024  2:16 PM  
Prophylactic measure